# Patient Record
Sex: FEMALE | Race: WHITE | NOT HISPANIC OR LATINO
[De-identification: names, ages, dates, MRNs, and addresses within clinical notes are randomized per-mention and may not be internally consistent; named-entity substitution may affect disease eponyms.]

---

## 2024-03-11 ENCOUNTER — NON-APPOINTMENT (OUTPATIENT)
Age: 59
End: 2024-03-11

## 2024-03-13 ENCOUNTER — APPOINTMENT (OUTPATIENT)
Dept: GERIATRICS | Facility: CLINIC | Age: 59
End: 2024-03-13
Payer: COMMERCIAL

## 2024-03-13 VITALS
HEART RATE: 75 BPM | DIASTOLIC BLOOD PRESSURE: 90 MMHG | RESPIRATION RATE: 16 BRPM | OXYGEN SATURATION: 98 % | SYSTOLIC BLOOD PRESSURE: 150 MMHG | WEIGHT: 140 LBS | TEMPERATURE: 97.6 F

## 2024-03-13 PROBLEM — Z00.00 ENCOUNTER FOR PREVENTIVE HEALTH EXAMINATION: Status: ACTIVE | Noted: 2024-03-13

## 2024-03-13 PROCEDURE — 99205 OFFICE O/P NEW HI 60 MIN: CPT

## 2024-03-13 NOTE — ASSESSMENT
[FreeTextEntry1] : 59 y/o female with lung mass, widespread osseus metastasis, back pain anxiety, depression, anorexia, insomnia, diarrhea Bx pending- MRI spine pending no pathological fx no s/s of spinal compression  Back pain- ES tylenol. salon pas, oxycodone 5 mg po q6h prn- discussed side effects of drowsiness lethargy and not to drive or operate heavy machinery if feeling drowsy with meds  Anxiety/ depression/ anorexia-/ insomnia- mirtazapine 7.5 mg po qhs medical cannbis  certificate provided- PC3- [id number]  HCP discussed- form completed-  f/up in 2 weeks [Depression] : depression [Advanced Directives] : advanced directives [Pain Management] : pain management [Medication Management] : medication management [Completed Healthcare Proxy] : completed healthcare proxy [Full Code] : Code Status: Full Code

## 2024-03-13 NOTE — HISTORY OF PRESENT ILLNESS
[FreeTextEntry1] : 57 y/o F referred for palliative management by Dr Hitchcock. PMHx of depression/anxiety- not on any meds. Pt's dad passed away from Lung cancer in 2023- she was cleaning out his home and felt back pain beginning of February and then developed palpitations- went ot ED 24- diagnosed with new onset A Fib- started on metoprolol and eliquis CT chest done to r/o PE- no PE but showed 3 cm irregular mass in NICANOR, possible mass liver PET scan- + lung mass 3.3 cm, 7 mm RML nodule, multiple additional subcm nodules widespread osseus mets- C2, T6 pedicle, Left 8th rib, lytic lesion and endplate fx in L2 lesion L5, left iliac wing, left sacrum, rt iliac bone rt sup acetabulum, left ischial tuberosity  Brain MRI done yesterday reportedly positive for brain mets BX scheduled for tomorrow  Social Hx- smoker age [age] then quit, now smokes occasionally works as a  hx of recreational drug use in high school, occ ETOH  Family Hx of cancer- colon/ lung- dad sister- breast cancer  MRI imaging pending of the whole spine per Dr Hitchcock' s note Patient is very distraught  with this life changing diagnosis. She lives alone and is independent in ADL/ambulation. She has 2 daughters- 28 and 26 years old Her sister  of breast cancer 6-7 years ago She has a brother but unfortunately they have been estranged since her father's death last year. c/o back pain which sometimes rradiates down the buttocks She is taking ES tylenol/ salon pas  for the pain which helps but does not take the pain away rt sided shock like chest pain 10/10 increases with movement, no pain at rest decreased appetite difficulty sleeping very anxious and depressed

## 2024-03-13 NOTE — PHYSICAL EXAM
[General Appearance - Alert] : alert [General Appearance - In No Acute Distress] : in no acute distress [General Appearance - Well Nourished] : well nourished [General Appearance - Well Developed] : well developed [General Appearance - Well-Appearing] : healthy appearing [FreeTextEntry1] : anxious, tearful depressed [Sclera] : the sclera and conjunctiva were normal [PERRL With Normal Accommodation] : pupils were equal in size, round, and reactive to light [Extraocular Movements] : extraocular movements were intact [Normal Oral Mucosa] : normal oral mucosa [Neck Appearance] : the appearance of the neck was normal [] : no respiratory distress [Exaggerated Use Of Accessory Muscles For Inspiration] : no accessory muscle use [Apical Impulse] : the apical impulse was normal [Heart Rate And Rhythm] : heart rate was normal and rhythm regular [Heart Sounds] : normal S1 and S2 [Arterial Pulses Carotid] : carotid pulses were normal with no bruits [Bowel Sounds] : normal bowel sounds [Abdomen Soft] : soft [Abdomen Tenderness] : non-tender [Cranial Nerves] : cranial nerves 2-12 were intact [Oriented To Time, Place, And Person] : oriented to person, place, and time [Impaired Insight] : insight and judgment were intact

## 2024-03-21 ENCOUNTER — RESULT REVIEW (OUTPATIENT)
Age: 59
End: 2024-03-21

## 2024-03-21 ENCOUNTER — APPOINTMENT (OUTPATIENT)
Dept: HEMATOLOGY ONCOLOGY | Facility: CLINIC | Age: 59
End: 2024-03-21
Payer: COMMERCIAL

## 2024-03-21 VITALS
RESPIRATION RATE: 16 BRPM | WEIGHT: 147 LBS | TEMPERATURE: 97.4 F | HEIGHT: 65 IN | BODY MASS INDEX: 24.49 KG/M2 | DIASTOLIC BLOOD PRESSURE: 91 MMHG | SYSTOLIC BLOOD PRESSURE: 160 MMHG | OXYGEN SATURATION: 97 % | HEART RATE: 67 BPM

## 2024-03-21 DIAGNOSIS — Z87.891 PERSONAL HISTORY OF NICOTINE DEPENDENCE: ICD-10-CM

## 2024-03-21 DIAGNOSIS — Z80.0 FAMILY HISTORY OF MALIGNANT NEOPLASM OF DIGESTIVE ORGANS: ICD-10-CM

## 2024-03-21 DIAGNOSIS — Z80.1 FAMILY HISTORY OF MALIGNANT NEOPLASM OF TRACHEA, BRONCHUS AND LUNG: ICD-10-CM

## 2024-03-21 DIAGNOSIS — Z87.898 PERSONAL HISTORY OF OTHER SPECIFIED CONDITIONS: ICD-10-CM

## 2024-03-21 DIAGNOSIS — Z86.39 PERSONAL HISTORY OF OTHER ENDOCRINE, NUTRITIONAL AND METABOLIC DISEASE: ICD-10-CM

## 2024-03-21 DIAGNOSIS — Z80.3 FAMILY HISTORY OF MALIGNANT NEOPLASM OF BREAST: ICD-10-CM

## 2024-03-21 PROCEDURE — G2212 PROLONG OUTPT/OFFICE VIS: CPT

## 2024-03-21 PROCEDURE — 99205 OFFICE O/P NEW HI 60 MIN: CPT

## 2024-03-21 RX ORDER — APIXABAN 5 MG/1
5 TABLET, FILM COATED ORAL
Refills: 0 | Status: ACTIVE | COMMUNITY

## 2024-03-22 PROBLEM — Z80.0 FAMILY HISTORY OF ESOPHAGEAL CARCINOMA: Status: ACTIVE | Noted: 2024-03-21

## 2024-03-22 PROBLEM — Z86.39 HISTORY OF THYROID NODULE: Status: RESOLVED | Noted: 2024-03-21 | Resolved: 2024-03-22

## 2024-03-22 PROBLEM — Z87.891 FORMER SMOKER: Status: ACTIVE | Noted: 2024-03-21

## 2024-03-22 PROBLEM — Z87.898 FORMER CONSUMPTION OF ALCOHOL: Status: ACTIVE | Noted: 2024-03-21

## 2024-03-22 PROBLEM — Z80.1 FAMILY HISTORY OF LUNG CANCER: Status: ACTIVE | Noted: 2024-03-21

## 2024-03-22 PROBLEM — Z80.3 FAMILY HISTORY OF MALIGNANT NEOPLASM OF BREAST: Status: ACTIVE | Noted: 2024-03-21

## 2024-03-25 ENCOUNTER — APPOINTMENT (OUTPATIENT)
Dept: RADIATION ONCOLOGY | Facility: CLINIC | Age: 59
End: 2024-03-25
Payer: COMMERCIAL

## 2024-03-25 VITALS
BODY MASS INDEX: 24.49 KG/M2 | HEIGHT: 65 IN | HEART RATE: 77 BPM | RESPIRATION RATE: 18 BRPM | DIASTOLIC BLOOD PRESSURE: 86 MMHG | WEIGHT: 147 LBS | OXYGEN SATURATION: 97 % | SYSTOLIC BLOOD PRESSURE: 171 MMHG

## 2024-03-25 PROCEDURE — 99205 OFFICE O/P NEW HI 60 MIN: CPT | Mod: 25

## 2024-03-27 ENCOUNTER — APPOINTMENT (OUTPATIENT)
Dept: HEMATOLOGY ONCOLOGY | Facility: CLINIC | Age: 59
End: 2024-03-27
Payer: COMMERCIAL

## 2024-03-27 ENCOUNTER — NON-APPOINTMENT (OUTPATIENT)
Age: 59
End: 2024-03-27

## 2024-03-27 PROCEDURE — 99214 OFFICE O/P EST MOD 30 MIN: CPT

## 2024-03-27 RX ORDER — FOLIC ACID 1 MG/1
1 TABLET ORAL DAILY
Qty: 90 | Refills: 3 | Status: ACTIVE | COMMUNITY
Start: 2024-03-27 | End: 1900-01-01

## 2024-03-27 RX ORDER — OLANZAPINE 2.5 MG/1
2.5 TABLET, FILM COATED ORAL
Qty: 30 | Refills: 2 | Status: ACTIVE | COMMUNITY
Start: 2024-03-27 | End: 1900-01-01

## 2024-03-27 RX ORDER — LIDOCAINE AND PRILOCAINE 25; 25 MG/G; MG/G
2.5-2.5 CREAM TOPICAL
Qty: 1 | Refills: 4 | Status: ACTIVE | COMMUNITY
Start: 2024-03-27 | End: 1900-01-01

## 2024-03-27 NOTE — REASON FOR VISIT
[Home] : at home, [unfilled] , at the time of the visit. [Medical Office: ([facility])___] : at the medical office located in  [Patient] : the patient [Self] : self [Follow-Up Visit] : a follow-up [Friend] : friend [FreeTextEntry2] : Metastatic lung cancer

## 2024-03-27 NOTE — ASSESSMENT
[FreeTextEntry1] : 58-year-old female referred by Dr. Aryan Tinoco for evaluation of newly diagnosed lung cancer st 4 with bone and brain metastasis. Patient is a former smoker- smoked since age 14 x 15 years. PETCT - bone metastasis, 3.3 cm left apical nodule. MRI brain with > 20 small mets. Patient had some back pain intermittently. Reviewed options for systemic therapy including chemotherapy based on Alimta/ carboplatin + targeted therapy based on the results of pathology report and NGS. Order [facility]. Guardant 360 ordered. 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  Patient offered systemic therapy with Osimertinib / carbo/ Alimta ( chemo x 4 cycles) based on FLAURA2 trial.  Xgeva Given good penetrance of TKI into the brain tissue will defer WBRT and repeat brain MRI in 3 weeks  Treatment schema, side effects and monitoring parameters explained to patient.  Treatment at [place]  Alimta 500mg /m2 x 1.74 =870 mg x 4 q 21 d Carboplatin AUC 6 - 704 mg x 4 Q 21 d Neulasta on pro 6 mg Osimertinib 80 mg PO daily  # Rad onc and NS evaluation for tx  #Patient offered genetic testing.- sister with BCA -  age 50. We discussed: Plan for genetic panel.   Reviewed with patient impact of positive vs negative results and that test might not be informative or .  We discussed that blood related family members might be carrying the same genetic mutation. Test confidentiality.  Options or limitations of medical surveillance and strategies for prevention after genetic testing results. Importance of sharing genetic test results with at-risk relatives they might benefit from this information.  Plan for follow up after testing

## 2024-03-27 NOTE — ASSESSMENT
[FreeTextEntry1] : 58-year-old female referred by Dr. Aryan Tinoco for evaluation of newly diagnosed lung cancer st 4 with bone and brain metastasis. Patient is a former smoker- smoked since age 14 x 15 years. PETCT - bone metastasis, 3.3 cm left apical nodule. MRI brain with mets. Patient had some back pain intermittently. Reviewed options for systemic therapy including chemotherapy based on Alimta/ carboplatin + targeted therapy based on the results of pathology report and NGS. Order [facility]. Guardant 360 ordered. Palliative care   # Rad onc and NS evaluation for tx  #Patient offered genetic testing.- sister with BCA -  age 50. We discussed: Plan for genetic panel.   Reviewed with patient impact of positive vs negative results and that test might not be informative or .  We discussed that blood related family members might be carrying the same genetic mutation. Test confidentiality.  Options or limitations of medical surveillance and strategies for prevention after genetic testing results. Importance of sharing genetic test results with at-risk relatives they might benefit from this information.  Plan for follow up after testing

## 2024-03-27 NOTE — HISTORY OF PRESENT ILLNESS
[Disease: _____________________] : Disease: [unfilled] [T: ___] : T[unfilled] [M: ___] : M[unfilled] [de-identified] : 58-year-old female presents today for initial consultation of metastatic lung cancer, referred by Dr. Tinoco.  Patient presented to ED with complaints of lower back pain x 1 week and palpitations on 2024.  CT of the chest showed a 3cm irregular mass in the NICANOR, small infiltrates in the lungs, normal pleural and a possible mass in the medial aspect of the liver.  She was discharged home and underwent a PET CT.  Currently she is rating her lower back pain 6-7- improves with moving around, she admits to some weight loss since she found out of her diagnosis. She does have a dry cough, denies SOB.    PET CT 3/7/2024: Hypermetabolic left apical malignancy with widespread osseous mets Hypermetabolic left lower paratracheal node, likely metastatic Multiple bilateral pulmonary nodules measuring 7mm without hypermetabolic activity, likely below the resolution of PET, indeterminate for met disease Hypermetabolic focus between the liver and right adrenal gland favored to represent adrenal gland mets 2.9cm right thyroid nodule without hypermetabolic activity.  Rec thyroid US.    3/12/2024: MRI cervical spine: Findings suspicious for osseous mets within C2 posterior elements and T2 vertebral body.  Redemonstrated multiple small mets in the posterior fossa  3/12/2024: MRI Brain: New diffuse bilateral supratentorial and infratentorial small enhancing nodular lesions, compatible with mets.    3/14/2024: Biopsy  L5 vertebral bone lesion: Metastatic adenocarcinoma, consistent with lung primary, involving bone.  NGS PENDING   3/18/2024: MRI Lumbar Spine: Osseous mets are noted.  Abscesses involving the L2 and L4 vertebral bodies protrude posteriorly.    Social Hx- Former smoker, quit 29 yrs ago, smoked 1 PPD x 17 yrs Former ETOH use Denies recreational drug use , with 2 children Self employed   Family Hx- Sister  from met breast cancer 54 Father  met lung cancer in his 80s (smoker) Maternal auth with possible lung cancer  Maternal cousin  from esophageal cancer in his 50s Paternal uncle  with possible lung cancer   She never had genetic testing  Last Mammogram- 2024 Colonoscopy- 7 yrs ago Pap/pelvic- unsure     [de-identified] : Adenocarcinoma

## 2024-03-27 NOTE — HISTORY OF PRESENT ILLNESS
[Disease: _____________________] : Disease: [unfilled] [M: ___] : M[unfilled] [T: ___] : T[unfilled] [de-identified] : 58-year-old female presents today for initial consultation of metastatic lung cancer, referred by Dr. Tinoco.  Patient presented to ED with complaints of lower back pain x 1 week and palpitations on 2024.  CT of the chest showed a 3cm irregular mass in the NICANOR, small infiltrates in the lungs, normal pleural and a possible mass in the medial aspect of the liver.  She was discharged home and underwent a PET CT.  Currently she is rating her lower back pain 6-7- improves with moving around, she admits to some weight loss since she found out of her diagnosis. She does have a dry cough, denies SOB.    PET CT 3/7/2024: Hypermetabolic left apical malignancy with widespread osseous mets Hypermetabolic left lower paratracheal node, likely metastatic Multiple bilateral pulmonary nodules measuring 7mm without hypermetabolic activity, likely below the resolution of PET, indeterminate for met disease Hypermetabolic focus between the liver and right adrenal gland favored to represent adrenal gland mets 2.9cm right thyroid nodule without hypermetabolic activity.  Rec thyroid US.    3/12/2024: MRI cervical spine: Findings suspicious for osseous mets within C2 posterior elements and T2 vertebral body.  Redemonstrated multiple small mets in the posterior fossa  3/12/2024: MRI Brain: New diffuse bilateral supratentorial and infratentorial small enhancing nodular lesions, compatible with mets.    3/14/2024: Biopsy  L5 vertebral bone lesion: Metastatic adenocarcinoma, consistent with lung primary, involving bone.  NGS PENDING   3/18/2024: MRI Lumbar Spine: Osseous mets are noted.  Abscesses involving the L2 and L4 vertebral bodies protrude posteriorly.    Social Hx- Former smoker, quit 29 yrs ago, smoked 1 PPD x 17 yrs Former ETOH use Denies recreational drug use , with 2 children Self employed   Family Hx- Sister  from met breast cancer 54 Father  met lung cancer in his 80s (smoker) Maternal auth with possible lung cancer  Maternal cousin  from esophageal cancer in his 50s Paternal uncle  with possible lung cancer   She never had genetic testing  Last Mammogram- 2024 Colonoscopy- 7 yrs ago Pap/pelvic- unsure     [de-identified] : Adenocarcinoma

## 2024-03-28 ENCOUNTER — NON-APPOINTMENT (OUTPATIENT)
Age: 59
End: 2024-03-28

## 2024-03-29 ENCOUNTER — RESULT REVIEW (OUTPATIENT)
Age: 59
End: 2024-03-29

## 2024-03-31 NOTE — LETTER CLOSING
[Consult Closing:] : Thank you for allowing me to participate in the care of this patient.  If you have any questions, please do not hesitate to contact me. [Sincerely yours,] : Sincerely yours, [FreeTextEntry3] : Alicia Alvarez MD

## 2024-03-31 NOTE — HISTORY OF PRESENT ILLNESS
[FreeTextEntry1] : Ms. Mckoy is a 58 year old female, diagnosed with metastatic lung cancer with hepatic, adrenal,  osseous and CNS metastasis, referred to our office for a consultation to discuss palliative radiation therapy.   Ms. Mckoy present to the ER with complaints of lower back pain and palpitations on 24. CT Chest (24) demonstrated dense left apical mass measuring 3 cm in diameter. Multiple patchy nodular airspace opacities. Hyper-enhancing mass along the inferior aspect of hepatic segment IVb measuring 1.9 cm.   PET/CT (3/7/24) revealed hypermetabolic left apical malignancy with widespread osseous metastases. Hypermetabolic left lower paratracheal node, likely metastatic. Multiple bilateral pulmonary nodules measuring 7 mm without hypermetabolic activity - indeterminate for metastatic disease. Hypermetabolic focus between the liver and right adrenal gland favored to represent adrenal gland metastatic disease. 2.9 cm right thyroid nodule without hypermetabolic activity. Thyroid US recommended.   MRI Cervical Spine (3/12/24) finding suspicious for osseous metastases within the C2 posterior elements and T2 vertebral body. No epidural extension of disease. Multilevel cervical spine degenerative disease. Multiple small mestastases in the posterior fossa.  MRI brain (3/12/24) demonstrated diffuse bilateral supra and infratentorial small enhnancing nodular lesions compatible with metastasis.There was no associated mass effect or hemorrhage.  On 3/15/24, she underwent  a biopsy  of L5 and pathology revealed metastatic adenocarcinoma, consistent with lung primary, involving bone.   On 3/18/24, MRI Lumbar Spine revealed: osseous metastatic disease was noted. Abscesses involving the L2 and L4 vertebral bodies protrude posteriorly.  She was started on oxycodone 5 MG by Dr. Nayak, which she reports is effective for approximately 1 hour. She was also given mirtazapine for sleep. She reports her pain in the right chest to be a 10 / 10 on the pain scale, which is aggravated with the lifting of her arms. Her lower back pain with exertion is reported to be a 10 / 10 with sitting to standing motions or prolonged sitting. She denies any cough, shortness of breath, hemoptysis, phlegm, weight loss and appetite.   Ms. Mckoy is a former smoker, who quit 29 years ago, smoked 1 PPD x 17 years ago. No alcohol use. She has family history of a sister  from met breast cancer 54, father  met lung cancer in his 80s (smoker), Maternal aunt with possible lung cancer, maternal cousin  from esophageal cancer in his 50s, and paternal uncle  with possible lung cancer.   Ms. Mckoy presents today to discuss palliative radiation therapy to the brain and the cervical/lumbar spine.

## 2024-03-31 NOTE — REASON FOR VISIT
[Lung Cancer] : lung cancer [Consideration for Non-Curative Therapy] : consideration for non-curative therapy for [Brain Metastasis] : brain metastasis [Bone Metastasis] : bone metastasis

## 2024-03-31 NOTE — VITALS
[Least Pain Intensity: 0/10] : 0/10 [Maximal Pain Intensity: 0/10] : 0/10 [100: Normal, no complaints, no evidence of disease.] : 100: Normal, no complaints, no evidence of disease. [ECOG Performance Status: 0 - Fully active, able to carry on all pre-disease performance without restriction] : Performance Status: 0 - Fully active, able to carry on all pre-disease performance without restriction [Date: ____________] : Patient's last distress assessment performed on [unfilled]. [6 - Distress Level] : Distress Level: 6

## 2024-03-31 NOTE — DISEASE MANAGEMENT
[Clinical] : TNM Stage: c [IV] : IV [FreeTextEntry4] : Metastatic nonsmall cell lung cancer with hepatic, adrenal,  osseous and CNS metastasis [TTNM] : x [NTNM] : x [MTNM] : 1

## 2024-04-02 ENCOUNTER — NON-APPOINTMENT (OUTPATIENT)
Age: 59
End: 2024-04-02

## 2024-04-03 ENCOUNTER — APPOINTMENT (OUTPATIENT)
Dept: GERIATRICS | Facility: CLINIC | Age: 59
End: 2024-04-03
Payer: COMMERCIAL

## 2024-04-03 ENCOUNTER — APPOINTMENT (OUTPATIENT)
Dept: HEMATOLOGY ONCOLOGY | Facility: CLINIC | Age: 59
End: 2024-04-03

## 2024-04-03 ENCOUNTER — RESULT REVIEW (OUTPATIENT)
Age: 59
End: 2024-04-03

## 2024-04-03 ENCOUNTER — NON-APPOINTMENT (OUTPATIENT)
Age: 59
End: 2024-04-03

## 2024-04-03 VITALS
SYSTOLIC BLOOD PRESSURE: 137 MMHG | DIASTOLIC BLOOD PRESSURE: 81 MMHG | OXYGEN SATURATION: 99 % | RESPIRATION RATE: 16 BRPM | WEIGHT: 147.06 LBS | BODY MASS INDEX: 24.5 KG/M2 | HEIGHT: 65 IN | TEMPERATURE: 97.6 F | HEART RATE: 64 BPM

## 2024-04-03 VITALS
DIASTOLIC BLOOD PRESSURE: 81 MMHG | HEIGHT: 65 IN | TEMPERATURE: 97.6 F | RESPIRATION RATE: 16 BRPM | WEIGHT: 147 LBS | BODY MASS INDEX: 24.49 KG/M2 | HEART RATE: 64 BPM | OXYGEN SATURATION: 99 % | SYSTOLIC BLOOD PRESSURE: 137 MMHG

## 2024-04-03 DIAGNOSIS — G89.3 NEOPLASM RELATED PAIN (ACUTE) (CHRONIC): ICD-10-CM

## 2024-04-03 DIAGNOSIS — M54.50 LOW BACK PAIN, UNSPECIFIED: ICD-10-CM

## 2024-04-03 PROCEDURE — 99214 OFFICE O/P EST MOD 30 MIN: CPT

## 2024-04-03 NOTE — PHYSICAL EXAM
[General Appearance - Alert] : alert [General Appearance - In No Acute Distress] : in no acute distress [General Appearance - Well Nourished] : well nourished [General Appearance - Well Developed] : well developed [General Appearance - Well-Appearing] : healthy appearing [Sclera] : the sclera and conjunctiva were normal [PERRL With Normal Accommodation] : pupils were equal in size, round, and reactive to light [Extraocular Movements] : extraocular movements were intact [Outer Ear] : the ears and nose were normal in appearance [Normal Oral Mucosa] : normal oral mucosa [Neck Appearance] : the appearance of the neck was normal [] : no respiratory distress [Respiration, Rhythm And Depth] : normal respiratory rhythm and effort [Exaggerated Use Of Accessory Muscles For Inspiration] : no accessory muscle use [Auscultation Breath Sounds / Voice Sounds] : lungs were clear to auscultation bilaterally [Apical Impulse] : the apical impulse was normal [Heart Rate And Rhythm] : heart rate was normal and rhythm regular [Abdomen Soft] : soft [Bowel Sounds] : normal bowel sounds [Abdomen Tenderness] : non-tender [Abnormal Walk] : normal gait [Skin Turgor] : normal skin turgor [Skin Color & Pigmentation] : normal skin color and pigmentation [Oriented To Time, Place, And Person] : oriented to person, place, and time [Cranial Nerves] : cranial nerves 2-12 were intact [Affect] : the affect was normal [Impaired Insight] : insight and judgment were intact [Memory Recent] : recent memory was not impaired [Mood] : the mood was normal [Over the Past 2 Weeks, Have You Felt Down, Depressed, or Hopeless?] : 1.) Over the past 2 weeks, have you felt down, depressed, or hopeless? No [Memory Remote] : remote memory was not impaired [Over the Past 2 Weeks, Have You Felt Little Interest or Pleasure Doing Things?] : 2.) Over the past 2 weeks, have you felt little interest or pleasure doing things? No

## 2024-04-03 NOTE — ASSESSMENT
[FreeTextEntry1] : 57 y/o female with lung mass, widespread osseus metastasis, back pain anxiety, depression, anorexia, insomnia, diarrhea no s/s of spinal compression  Back pain/ rt sided chest pain- oxycodone not effective- effect lastz only for 1 hour dc oxycodone start tizanidine 2 mg tid buprenorphine patch 5 mcg q week  Lidocaine patch topically to lower back- 12 h on, 12 h off - discussed side effects of drowsiness lethargy and not to drive or operate heavy machinery if feeling drowsy with meds  Anxiety/ depression/ anorexia-/ insomnia- cont mirtazapine 7.5 mg po qhs medical cannbis certificate provided- PC3- [id number]  f/up in 3 weeks.   [Driving] : driving [Patient/Caregiver is not ready to engage in discussion] : Patient/caregiver is not ready to engage in discussion [Pain Management] : pain management [Full Code] : Code Status: Full Code

## 2024-04-03 NOTE — HISTORY OF PRESENT ILLNESS
[FreeTextEntry1] : 59 y/o F referred for palliative management by Dr Hitchcock. PMHx of depression/anxiety- not on any meds. Pt's dad passed away from Lung cancer in 2023- she was cleaning out his home and felt back pain beginning of February and then developed palpitations- went ot ED 24- diagnosed with new onset A Fib- started on metoprolol and eliquis CT chest done to r/o PE- no PE but showed 3 cm irregular mass in NICANOR, possible mass liver PET scan- + lung mass 3.3 cm, 7 mm RML nodule, multiple additional subcm nodules widespread osseus mets- C2, T6 pedicle, Left 8th rib, lytic lesion and endplate fx in L2 lesion L5, left iliac wing, left sacrum, rt iliac bone rt sup acetabulum, left ischial tuberosity  Brain MRI done yesterday reportedly positive for brain mets BX scheduled for tomorrow  Social Hx- smoker age [age] then quit, now smokes occasionally works as a  hx of recreational drug use in high school, occ ETOH  Family Hx of cancer- colon/ lung- dad sister- breast cancer  MRI imaging pending of the whole spine per Dr Hitchcock' s note Patient is very distraught with this life changing diagnosis. She lives alone and is independent in ADL/ambulation. She has 2 daughters- 28 and 26 years old Her sister  of breast cancer 6-7 years ago She has a brother but unfortunately they have been estranged since her father's death last year. c/o back pain which sometimes rradiates down the buttocks She is taking ES tylenol/ salon pas for the pain which helps but does not take the pain away rt sided shock like chest pain 10/10 increases with movement, no pain at rest decreased appetite difficulty sleeping very anxious and depressed  3/12/2024: MRI Brain: New diffuse bilateral supratentorial and infratentorial small enhancing nodular lesions, compatible with mets.  3/14/2024: Biopsy L5 vertebral bone lesion: Metastatic adenocarcinoma, consistent with lung primary, involving bone. NGS PENDING  3/18/2024: MRI Lumbar Spine: Osseous mets are noted. Abscesses involving the L2 and L4 vertebral bodies protrude posteriorly. started Alimta 500mg /m2 x 1.74 =870 mg x 4 q 21 d Carboplatin AUC 6 - 704 mg x 4 Q 21 d Neulasta on pro 6 mg Osimertinib 80 mg PO daily.  Seen by Rad Onc Dr Alvarez- Next generation sequencing is pending. Depending on the results whole brain RT with hippocampal sparing may be able to be omitted or delayed. Ms. Mckoy may benefit from palliative RT to her lumbar spine.  Stereotactic radiation therapy was reviewed as part of the process for treatment. Specifically, SRS/SBRT is appropriate in order to provide an appropriately high radiation dose per fraction to the tumor.  Tobias placed 3/27 Planned MRI Brain in 3 weeks to assess response  4/3/24 Seen for f/up in the infusion center started chemorx today still withj intermittent pain to back/ rt side shoulder affected by neck and back movement Pain appears to be spasmodic related to movement but very severe 10/10 no constipation; moo d better, sleeping better

## 2024-04-07 ENCOUNTER — RESULT REVIEW (OUTPATIENT)
Age: 59
End: 2024-04-07

## 2024-04-11 ENCOUNTER — APPOINTMENT (OUTPATIENT)
Dept: HEMATOLOGY ONCOLOGY | Facility: CLINIC | Age: 59
End: 2024-04-11
Payer: COMMERCIAL

## 2024-04-11 ENCOUNTER — RESULT REVIEW (OUTPATIENT)
Age: 59
End: 2024-04-11

## 2024-04-11 ENCOUNTER — TRANSCRIPTION ENCOUNTER (OUTPATIENT)
Age: 59
End: 2024-04-11

## 2024-04-11 VITALS
DIASTOLIC BLOOD PRESSURE: 67 MMHG | WEIGHT: 144.6 LBS | BODY MASS INDEX: 24.09 KG/M2 | HEART RATE: 63 BPM | OXYGEN SATURATION: 98 % | SYSTOLIC BLOOD PRESSURE: 115 MMHG | TEMPERATURE: 97.2 F | RESPIRATION RATE: 16 BRPM | HEIGHT: 65 IN

## 2024-04-11 PROCEDURE — 99214 OFFICE O/P EST MOD 30 MIN: CPT

## 2024-04-16 ENCOUNTER — NON-APPOINTMENT (OUTPATIENT)
Age: 59
End: 2024-04-16

## 2024-04-16 NOTE — HISTORY OF PRESENT ILLNESS
[Disease: _____________________] : Disease: [unfilled] [T: ___] : T[unfilled] [M: ___] : M[unfilled] [de-identified] : 58-year-old female presents today for initial consultation of metastatic lung cancer, referred by Dr. Tinoco.  Patient presented to ED with complaints of lower back pain x 1 week and palpitations on 2024.  CT of the chest showed a 3cm irregular mass in the NICANOR, small infiltrates in the lungs, normal pleural and a possible mass in the medial aspect of the liver.  She was discharged home and underwent a PET CT.  Currently she is rating her lower back pain 6-7- improves with moving around, she admits to some weight loss since she found out of her diagnosis. She does have a dry cough, denies SOB.    PET CT 3/7/2024: Hypermetabolic left apical malignancy with widespread osseous mets Hypermetabolic left lower paratracheal node, likely metastatic Multiple bilateral pulmonary nodules measuring 7mm without hypermetabolic activity, likely below the resolution of PET, indeterminate for met disease Hypermetabolic focus between the liver and right adrenal gland favored to represent adrenal gland mets 2.9cm right thyroid nodule without hypermetabolic activity.  Rec thyroid US.    3/12/2024: MRI cervical spine: Findings suspicious for osseous mets within C2 posterior elements and T2 vertebral body.  Redemonstrated multiple small mets in the posterior fossa  3/12/2024: MRI Brain: New diffuse bilateral supratentorial and infratentorial small enhancing nodular lesions, compatible with mets.    3/14/2024: Biopsy  L5 vertebral bone lesion: Metastatic adenocarcinoma, consistent with lung primary, involving bone.  NGS PENDING   3/18/2024: MRI Lumbar Spine: Osseous mets are noted.  Abscesses involving the L2 and L4 vertebral bodies protrude posteriorly.    Social Hx- Former smoker, quit 29 yrs ago, smoked 1 PPD x 17 yrs Former ETOH use Denies recreational drug use , with 2 children Self employed   Family Hx- Sister  from met breast cancer 54 Father  met lung cancer in his 80s (smoker) Maternal auth with possible lung cancer  Maternal cousin  from esophageal cancer in his 50s Paternal uncle  with possible lung cancer   She never had genetic testing  Last Mammogram- 2024 Colonoscopy- 7 yrs ago Pap/pelvic- unsure     [de-identified] : Adenocarcinoma [de-identified] : Patient is here is for follow up for adenocarcinoma. Patient states that she is feeling well except she had one day she was having severe acid reflux.

## 2024-04-16 NOTE — ASSESSMENT
[FreeTextEntry1] : 58-year-old female referred by Dr. Aryan Tinoco for evaluation of newly diagnosed lung cancer st 4 with bone and brain metastasis. Patient is a former smoker- smoked since age 14 x 15 years. PETCT - bone metastasis, 3.3 cm left apical nodule. MRI brain with > 20 small mets. Patient had some back pain intermittently. Reviewed options for systemic therapy including chemotherapy based on Alimta/ carboplatin + targeted therapy based on the results of pathology report and NGS. Order [facility]. [facility] ordered. Palliative care   Results of [facility] revealed EGFR mutation L858R in 18%.  Patient offered systemic therapy with Osimertinib / carbo/ Alimta ( chemo x 4 cycles) based on FLAURA2 trial.  Xgeva Given good penetrance of TKI into the brain tissue will defer WBRT and repeat brain MRI in 3 weeks  Treatment schema, side effects and monitoring parameters explained to patient.  Treatment at [facility]  Alimta 500mg /m2 x 1.74 =870 mg x 4 q 21 d Carboplatin AUC 6 - 704 mg x 4 Q 21 d Neulasta on pro 6 mg Osimertinib 80 mg PO daily  Cycle 1 4/3/24  # CNS mets Repeat MRI brain in 3--4 weeks MRI 24- reviewed, stable No neuro symptoms  # Rad onc and NS evaluation for tx - scheduled for RT back  #Patient offered genetic testing- sister with BCA -  age 50. Negative genetic testing [Palliative] : Goals of care discussed with patient: Palliative [Palliative Care Plan] : not applicable at this time

## 2024-04-16 NOTE — REASON FOR VISIT
[Follow-Up Visit] : a follow-up [Friend] : friend [Home] : at home, [unfilled] , at the time of the visit. [Medical Office: ([facility])___] : at the medical office located in  [Patient] : the patient [Self] : self [FreeTextEntry2] : Metastatic lung cancer

## 2024-04-22 ENCOUNTER — NON-APPOINTMENT (OUTPATIENT)
Age: 59
End: 2024-04-22

## 2024-04-22 NOTE — REASON FOR VISIT
[Routine On-Treatment] : a routine on-treatment visit for [Brain Metastasis] : brain metastasis [Bone Metastasis] : bone metastasis [Lung Cancer] : lung cancer

## 2024-04-23 ENCOUNTER — NON-APPOINTMENT (OUTPATIENT)
Age: 59
End: 2024-04-23

## 2024-04-25 ENCOUNTER — NON-APPOINTMENT (OUTPATIENT)
Age: 59
End: 2024-04-25

## 2024-04-25 ENCOUNTER — RESULT REVIEW (OUTPATIENT)
Age: 59
End: 2024-04-25

## 2024-04-25 ENCOUNTER — APPOINTMENT (OUTPATIENT)
Dept: HEMATOLOGY ONCOLOGY | Facility: CLINIC | Age: 59
End: 2024-04-25
Payer: COMMERCIAL

## 2024-04-25 VITALS
WEIGHT: 146.06 LBS | OXYGEN SATURATION: 99 % | DIASTOLIC BLOOD PRESSURE: 75 MMHG | HEART RATE: 69 BPM | SYSTOLIC BLOOD PRESSURE: 124 MMHG | BODY MASS INDEX: 24.33 KG/M2 | TEMPERATURE: 97.3 F | RESPIRATION RATE: 16 BRPM | HEIGHT: 65 IN

## 2024-04-25 PROCEDURE — 99214 OFFICE O/P EST MOD 30 MIN: CPT

## 2024-04-25 PROCEDURE — G2211 COMPLEX E/M VISIT ADD ON: CPT

## 2024-04-25 NOTE — HISTORY OF PRESENT ILLNESS
[de-identified] : 58-year-old female presents today for initial consultation of metastatic lung cancer, referred by Dr. Tinoco.  Patient presented to ED with complaints of lower back pain x 1 week and palpitations on 2024.  CT of the chest showed a 3cm irregular mass in the NICANOR, small infiltrates in the lungs, normal pleural and a possible mass in the medial aspect of the liver.  She was discharged home and underwent a PET CT.  Currently she is rating her lower back pain 6-7- improves with moving around, she admits to some weight loss since she found out of her diagnosis. She does have a dry cough, denies SOB.    PET CT 3/7/2024: Hypermetabolic left apical malignancy with widespread osseous mets Hypermetabolic left lower paratracheal node, likely metastatic Multiple bilateral pulmonary nodules measuring 7mm without hypermetabolic activity, likely below the resolution of PET, indeterminate for met disease Hypermetabolic focus between the liver and right adrenal gland favored to represent adrenal gland mets 2.9cm right thyroid nodule without hypermetabolic activity.  Rec thyroid US.    3/12/2024: MRI cervical spine: Findings suspicious for osseous mets within C2 posterior elements and T2 vertebral body.  Redemonstrated multiple small mets in the posterior fossa  3/12/2024: MRI Brain: New diffuse bilateral supratentorial and infratentorial small enhancing nodular lesions, compatible with mets.    3/14/2024: Biopsy  L5 vertebral bone lesion: Metastatic adenocarcinoma, consistent with lung primary, involving bone.  NGS PENDING   3/18/2024: MRI Lumbar Spine: Osseous mets are noted.  Abscesses involving the L2 and L4 vertebral bodies protrude posteriorly.    Social Hx- Former smoker, quit 29 yrs ago, smoked 1 PPD x 17 yrs Former ETOH use Denies recreational drug use , with 2 children Self employed   Family Hx- Sister  from met breast cancer 54 Father  met lung cancer in his 80s (smoker) Maternal auth with possible lung cancer  Maternal cousin  from esophageal cancer in his 50s Paternal uncle  with possible lung cancer   She never had genetic testing  Last Mammogram- 2024 Colonoscopy- 7 yrs ago Pap/pelvic- unsure     [de-identified] : Adenocarcinoma [de-identified] : Patient is here is for follow up for adenocarcinoma. Patient overall feels well with no new issues; she states that she has been having a lot of post nasal drip. She states that it started developed post radiation.  Patient had a brain MRI which revealed similar diffuse innumerable multifocal intracranial metastases in comparison to 3/12/2024 when allowing for differences in technique..  Patient is schedule for another brain MRI on May 1st, 2024.

## 2024-04-25 NOTE — ASSESSMENT
[FreeTextEntry1] : 58-year-old female referred by Dr. Aryan Tinoco for evaluation of newly diagnosed lung cancer st 4 with bone and brain metastasis. Patient is a former smoker- smoked since age 14 x 15 years. PETCT - bone metastasis, 3.3 cm left apical nodule. MRI brain with > 20 small mets. Patient had some back pain intermittently. Reviewed options for systemic therapy including chemotherapy based on Alimta/ carboplatin + targeted therapy based on the results of pathology report and NGS. Order [facility].  Palliative care referral  Results of [facility] revealed EGFR mutation L858R in 18%.  Patient offered systemic therapy with Osimertinib / carbo/ Alimta ( chemo x 4 cycles) based on FLAURA2 trial.  Xgeva Given good penetrance of TKI into the brain tissue will defer WBRT and repeat brain MRI in 3 weeks  Treatment schema, side effects and monitoring parameters explained to patient.  Treatment at [facility]  Alimta 500mg /m2 x 1.74 =870 mg x 4 q 21 d Carboplatin AUC 6 - 704 mg x 4 Q 21 d Neulasta on pro 6 mg Osimertinib 80 mg PO daily  Cycle 1 4/3/24 Cycle 2 24  # Nausea- reviewed antiemetics, olanzapine and zifran  # CNS mets Repeat MRI brain in 3--4 weeks MRI 24- reviewed, stable No neuro symptoms - repeat MRI 24  # Rad onc and NS evaluation for tx - scheduled for RT back  #Patient offered genetic testing- sister with BCA -  age 50. Negative genetic testing

## 2024-04-26 NOTE — HISTORY OF PRESENT ILLNESS
[FreeTextEntry1] : Ms. Mckoy  is present for OTV today. She is status post completion of radiation to the lumbar spine (2700 cGy in 3 fractions). She reports since starting radiation therapy she has notice an improvement in her back pain. She is now able to ge tin and out of the car easier or up from the couch without severe pain/discomfort. However, she is noticing a new "sciatic-like" pain on her left side. She is very emotional during today's visit and speaks about being overwhelmed with her diagnosis. 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 Overall, she tolerated treatment well and will follow up in 1 month for her post treatment evaluation.

## 2024-04-30 ENCOUNTER — APPOINTMENT (OUTPATIENT)
Dept: HEMATOLOGY ONCOLOGY | Facility: CLINIC | Age: 59
End: 2024-04-30

## 2024-04-30 ENCOUNTER — RESULT REVIEW (OUTPATIENT)
Age: 59
End: 2024-04-30

## 2024-04-30 VITALS
OXYGEN SATURATION: 98 % | BODY MASS INDEX: 23.74 KG/M2 | TEMPERATURE: 98.2 F | HEART RATE: 77 BPM | RESPIRATION RATE: 16 BRPM | HEIGHT: 65 IN | SYSTOLIC BLOOD PRESSURE: 95 MMHG | DIASTOLIC BLOOD PRESSURE: 87 MMHG | WEIGHT: 142.5 LBS

## 2024-05-01 ENCOUNTER — RESULT REVIEW (OUTPATIENT)
Age: 59
End: 2024-05-01

## 2024-05-06 ENCOUNTER — NON-APPOINTMENT (OUTPATIENT)
Age: 59
End: 2024-05-06

## 2024-05-07 ENCOUNTER — RESULT REVIEW (OUTPATIENT)
Age: 59
End: 2024-05-07

## 2024-05-07 ENCOUNTER — APPOINTMENT (OUTPATIENT)
Dept: HEMATOLOGY ONCOLOGY | Facility: CLINIC | Age: 59
End: 2024-05-07

## 2024-05-07 VITALS
RESPIRATION RATE: 16 BRPM | SYSTOLIC BLOOD PRESSURE: 110 MMHG | TEMPERATURE: 97.8 F | BODY MASS INDEX: 23.66 KG/M2 | WEIGHT: 142 LBS | HEART RATE: 87 BPM | DIASTOLIC BLOOD PRESSURE: 65 MMHG | HEIGHT: 65 IN | OXYGEN SATURATION: 100 %

## 2024-05-08 NOTE — PHYSICAL EXAM
[General Appearance - Alert] : alert [General Appearance - In No Acute Distress] : in no acute distress [Cranial Nerves Optic (II)] : visual acuity intact bilaterally,  pupils equal round and reactive to light [Cranial Nerves Oculomotor (III)] : extraocular motion intact [Cranial Nerves Trigeminal (V)] : facial sensation intact symmetrically [Cranial Nerves Facial (VII)] : face symmetrical [Cranial Nerves Vestibulocochlear (VIII)] : hearing was intact bilaterally [Cranial Nerves Glossopharyngeal (IX)] : tongue and palate midline [Cranial Nerves Accessory (XI - Cranial And Spinal)] : head turning and shoulder shrug symmetric [Cranial Nerves Hypoglossal (XII)] : there was no tongue deviation with protrusion [Motor Strength] : muscle strength was normal in all four extremities [Sensation Tactile Decrease] : light touch was intact [Abnormal Walk] : normal gait

## 2024-05-10 ENCOUNTER — NON-APPOINTMENT (OUTPATIENT)
Age: 59
End: 2024-05-10

## 2024-05-10 ENCOUNTER — APPOINTMENT (OUTPATIENT)
Dept: HEMATOLOGY ONCOLOGY | Facility: CLINIC | Age: 59
End: 2024-05-10

## 2024-05-10 ENCOUNTER — RESULT REVIEW (OUTPATIENT)
Age: 59
End: 2024-05-10

## 2024-05-10 ENCOUNTER — APPOINTMENT (OUTPATIENT)
Dept: HEART AND VASCULAR | Facility: CLINIC | Age: 59
End: 2024-05-10
Payer: COMMERCIAL

## 2024-05-10 VITALS
RESPIRATION RATE: 17 BRPM | DIASTOLIC BLOOD PRESSURE: 76 MMHG | OXYGEN SATURATION: 100 % | BODY MASS INDEX: 24.17 KG/M2 | WEIGHT: 145.06 LBS | HEIGHT: 65 IN | TEMPERATURE: 97.1 F | SYSTOLIC BLOOD PRESSURE: 139 MMHG | HEART RATE: 80 BPM

## 2024-05-10 VITALS
BODY MASS INDEX: 23.67 KG/M2 | TEMPERATURE: 97.6 F | HEART RATE: 74 BPM | WEIGHT: 142.06 LBS | SYSTOLIC BLOOD PRESSURE: 124 MMHG | HEIGHT: 65 IN | DIASTOLIC BLOOD PRESSURE: 72 MMHG | OXYGEN SATURATION: 97 % | RESPIRATION RATE: 18 BRPM

## 2024-05-10 DIAGNOSIS — R06.09 OTHER FORMS OF DYSPNEA: ICD-10-CM

## 2024-05-10 PROCEDURE — 93000 ELECTROCARDIOGRAM COMPLETE: CPT | Mod: 59

## 2024-05-10 PROCEDURE — 93242 EXT ECG>48HR<7D RECORDING: CPT

## 2024-05-10 PROCEDURE — 99204 OFFICE O/P NEW MOD 45 MIN: CPT | Mod: 25

## 2024-05-12 ENCOUNTER — NON-APPOINTMENT (OUTPATIENT)
Age: 59
End: 2024-05-12

## 2024-05-13 ENCOUNTER — APPOINTMENT (OUTPATIENT)
Dept: NEUROSURGERY | Facility: CLINIC | Age: 59
End: 2024-05-13
Payer: COMMERCIAL

## 2024-05-13 VITALS
OXYGEN SATURATION: 100 % | HEIGHT: 65 IN | HEART RATE: 93 BPM | WEIGHT: 145 LBS | DIASTOLIC BLOOD PRESSURE: 79 MMHG | SYSTOLIC BLOOD PRESSURE: 126 MMHG | BODY MASS INDEX: 24.16 KG/M2

## 2024-05-13 PROCEDURE — 99205 OFFICE O/P NEW HI 60 MIN: CPT

## 2024-05-13 NOTE — HISTORY OF PRESENT ILLNESS
[de-identified] : SURENDRA SILVA is a 59 year female with a PMH of anxiety/depression, afib on eliquis, anemia, former smoker quit 29 years ago, EGFR related non small cell lung cancer, with spine and brain metastases who presents to the office today at the request of her Oncologist Dr. Aragon for neurosurgical consultation due to gamma knife discussion for treatment of her brain metastases.    She initially presented in February 2024 with low back pain and palpitations for 1 week.  CT chest showed 3cm irregular mass in the left upper lobe with possible liver mass.  PET/CT on 3/7/24 demonstrated hypermetabolic, left apical malignancy with widespread osseous metastases, hypermetabolic left lower paratracheal node, likely metastatic and hypermetabolic focus between the liver and right adrenal gland favored to represent adrenal gland metastases. MRI cervical spine and lumbar spine were performed and demonstrated findings suspicious for osseous mets. She underwent biopsy of L5 vertebral body on 3/14/24: Metastatic adenocarcinoma, consistent with lung primary MRI brain 3/12/24 demonstrated numerous small mets in supratentorial and infratentorial compartments.  She was started on chemotherapy and underwent RT to lumbar spine (2700 cGy in 3 fractions).  She has recently undergone repeat MRI brain on 5/1/24 which demonstrates improvement or stability of current lesions.   She is here for gamma knife radiosurgery discussion for adjuvant treatment of her brain metastases.  The patient denies headaches, visual change, numbness, weakness of extremities, speech disturbance, dizziness, neck pain, vertigo. Reports that she recently had to stop tagrisso due to elevated liver function test. Meds: eliquis, folic acid, buprenorphine patch, olanzapine, mirtazapine, tagrisso, tizanidine, metoprolol Allergies: NKDA Soc Hx: ex smoker, former EtOH, has 2 children,  works as

## 2024-05-13 NOTE — END OF VISIT
[FreeTextEntry3] : I have seen the patient and reviewed the case together with PA and I agree with the final recommendations and plan of care.  Kiel Canales MD Neurosurgery  [Time Spent: ___ minutes] : I have spent [unfilled] minutes of time on the encounter.

## 2024-05-13 NOTE — DATA REVIEWED
[de-identified] : Exam: MRI BRAIN WAW IC Order#: [id number]    PROCEDURE: MRI brain with and without intravenous contrast, 5/1/2024  TECHNIQUE: 1.5 Pricila magnet. Multiplanar/multisequence imaging with and without intravenous contrast.  INTRAVENOUS CONTRAST: 6.5 mL Gadavist. 1 mL discarded.  COMPARISON: MRI brain 4/8/2024. Outside MRI brain 3/12/2024.  CLINICAL INFORMATION: Non-small cell lung cancer metastatic to the brain. Systemic therapy.  IMAGING FINDINGS: Numerous lesions consistent with metastatic disease scattered throughout the supratentorial and infratentorial compartments are again demonstrated. The largest supratentorial le annetta demonstrated on study 3/12/2024 was a 7 mm x 5 mm lesion in the periphery of the right frontal lobe (image 20 series 29). That lesion was less well visualized on the study of 4/8/2024. Current study demonstrates a 5 mm x 3 mm (image 20 series 18). Largest lesion on study 4/8/2024 was a 6 mm x 4 mm lesion at the anterior limb of the internal capsule (image 17 series 17). On the current study that lesion measures 5 mm x 2 mm (image 70 series 14). Largest lesion in the posterior fossa on study 3/12/2024 was a medial left cerebellar lesion measuring 7 mm x 6 mm on image 8 series 29. That lesion was less well-seen on study 4/8/2024. On the current study the lesion measures 4 mm x 4 mm (image 7 series 18). Numerous additional lesions have also decreased in size. Some lesions are not significantly changed. No new or enlarging lesion is demonstrated.  There is no vasogenic edema surrounding the lesions. No hemorrhagic lesions are demonstrated. There are no lesions with high signal on diffusion imaging.  There is no hydrocephalus.  There is no extra-axial collection.  A small retention cyst is again demonstrated in the right maxillary sinus. There is mild ethmoid sinus mucosal thickening. There is marked nasal septum deviation to the right side.  Again demonstrated is minimal left mastoid air cell mucosal thickening.  No orbital abnormality is demonstrated.  No neoplastic replacement of bone marrow is demonstrated.    IMPRESSION: Improvement in numerous small supratentorial and infratentorial metastatic lesions. No new or enlarging lesion.  --- End of Report ---  ***Electronically Signed *** ----------------------------------------------- Landen Briones MD 05/07/24 1409  Dictated on 05/07/24

## 2024-05-13 NOTE — ASSESSMENT
[FreeTextEntry1] : I have discussed the natural history and treatment options for brain metastatic disease with the patient. I explained the indications for medical management with antiepileptic medications and steroids, chemotherapy, radiation therapy, radiosurgery and surgery. I explained the indications of a combination of these treatment options. I discussed the risks, benefits, possible complications and expected outcome related to each treatment option.  In the end, I recommend Gamma Knife Radiosurgery to treat the patient's brain metastases. After considering the options, the patient is leaning towards treatment with Gamma Knife radiosurgery. She will also discuss the options with Dr. Aragon, her Oncologist. I have provided a referral to Dr. Cristina Zamora, my partner in Radiation Oncology at [facility] to begin coordination of care.   The patient understands the plan of care and is in agreement.

## 2024-05-14 ENCOUNTER — APPOINTMENT (OUTPATIENT)
Dept: RADIATION ONCOLOGY | Facility: CLINIC | Age: 59
End: 2024-05-14
Payer: COMMERCIAL

## 2024-05-14 VITALS
HEIGHT: 65 IN | HEART RATE: 85 BPM | BODY MASS INDEX: 23.82 KG/M2 | OXYGEN SATURATION: 99 % | SYSTOLIC BLOOD PRESSURE: 104 MMHG | DIASTOLIC BLOOD PRESSURE: 74 MMHG | RESPIRATION RATE: 16 BRPM | WEIGHT: 143 LBS

## 2024-05-14 PROCEDURE — 99214 OFFICE O/P EST MOD 30 MIN: CPT

## 2024-05-14 RX ORDER — OXYCODONE 5 MG/1
5 TABLET ORAL
Qty: 56 | Refills: 0 | Status: DISCONTINUED | COMMUNITY
Start: 2024-03-13 | End: 2024-05-14

## 2024-05-14 RX ORDER — METOPROLOL TARTRATE 25 MG/1
25 TABLET, FILM COATED ORAL
Refills: 0 | Status: DISCONTINUED | COMMUNITY
End: 2024-05-14

## 2024-05-14 RX ORDER — TIZANIDINE 2 MG/1
2 TABLET ORAL
Qty: 90 | Refills: 2 | Status: DISCONTINUED | COMMUNITY
Start: 2024-04-03 | End: 2024-05-14

## 2024-05-14 RX ORDER — MIRTAZAPINE 7.5 MG/1
7.5 TABLET, FILM COATED ORAL
Qty: 30 | Refills: 2 | Status: DISCONTINUED | COMMUNITY
Start: 2024-03-13 | End: 2024-05-14

## 2024-05-14 RX ORDER — BUPRENORPHINE 5 UG/H
5 PATCH, EXTENDED RELEASE TRANSDERMAL
Qty: 4 | Refills: 0 | Status: DISCONTINUED | COMMUNITY
Start: 2024-04-03 | End: 2024-05-14

## 2024-05-14 RX ORDER — LIDOCAINE 40 MG/G
4 PATCH TOPICAL
Qty: 30 | Refills: 3 | Status: DISCONTINUED | COMMUNITY
Start: 2024-04-03 | End: 2024-05-14

## 2024-05-14 RX ORDER — DEXAMETHASONE 4 MG/1
4 TABLET ORAL
Qty: 30 | Refills: 1 | Status: DISCONTINUED | COMMUNITY
Start: 2024-03-27 | End: 2024-05-14

## 2024-05-14 RX ORDER — ONDANSETRON 4 MG/1
4 TABLET, ORALLY DISINTEGRATING ORAL
Qty: 30 | Refills: 3 | Status: DISCONTINUED | COMMUNITY
Start: 2024-03-27 | End: 2024-05-14

## 2024-05-14 RX ORDER — OSIMERTINIB 40 1/1
40 TABLET, FILM COATED ORAL
Qty: 30 | Refills: 3 | Status: DISCONTINUED | COMMUNITY
Start: 2024-03-27 | End: 2024-05-14

## 2024-05-15 NOTE — REASON FOR VISIT
[Consideration of Curative Therapy] : consideration of curative therapy for [Brain Metastasis] : brain metastasis [Consideration for Non-Curative Therapy] : consideration for non-curative therapy for

## 2024-05-15 NOTE — REVIEW OF SYSTEMS
[Fatigue] : fatigue [Confused] : no confusion [Dizziness] : no dizziness [Fainting] : no fainting [Difficulty Walking] : no difficulty walking [Negative] : Allergic/Immunologic [FreeTextEntry5] : heart palpitations

## 2024-05-15 NOTE — HISTORY OF PRESENT ILLNESS
[FreeTextEntry1] : Ms. Mckoy is a 59 year old female, diagnosed with Stage IV metastatic lung cancer with hepatic, adrenal, osseous and CNS metastasis, referred to our office for a consultation to discuss gamma knife treatment of her brain metastases. She is s/p RT to the lumbar spine (2700cGy in 3 fractions) in 4/22/24 with Dr. Alvarez at [facility].  Ms. Mckoy initially presented to the ER with complaints of lower back pain and palpitations on 2/20/24. CT Chest (2/20/24) demonstrated dense left apical mass measuring 3 cm in diameter. Multiple patchy nodular airspace opacities. Hyper-enhancing mass along the inferior aspect of hepatic segment IVb measuring 1.9 cm.  PET/CT (3/7/24) revealed hypermetabolic left apical malignancy with widespread osseous metastases. Hypermetabolic left lower paratracheal node, likely metastatic. Multiple bilateral pulmonary nodules measuring 7 mm without hypermetabolic activity - indeterminate for metastatic disease. Hypermetabolic focus between the liver and right adrenal gland favored to represent adrenal gland metastatic disease. 2.9 cm right thyroid nodule without hypermetabolic activity. Thyroid US recommended.  MRI Cervical Spine (3/12/24) finding suspicious for osseous metastases within the C2 posterior elements and T2 vertebral body. No epidural extension of disease. Multilevel cervical spine degenerative disease. Multiple small mestastases in the posterior fossa. MRI brain (3/12/24) demonstrated diffuse bilateral supra and infratentorial small enhancing nodular lesions compatible with metastasis. There was no associated mass effect or hemorrhage.  On 3/15/24, she underwent a biopsy of L5 and pathology revealed metastatic adenocarcinoma, consistent with lung primary, involving bone. PDL1 30% (positive). EGFR+.  On 3/18/24, MRI Lumbar Spine revealed: osseous metastatic disease was noted. Abscesses involving the L2 and L4 vertebral bodies protrude posteriorly.  04/08/24 - MRI brain was stable, similar diffuse innumerable multifocal intracranial mets in comparison to 3/12/24.  05/01/24 - repeat MRI of the brain showed improvement or stability of current lesions.  Ms. Mckoy is on Osimertinib / carbo/ Alimta (chemo x 4 cycles) based on JEANE trial managed by Dr. Aragon. She had a consultation with Dr. Canales on 5/13/24 to discuss gamma knife.  5/14/24 Ms Mckoy chemotherapy is currently on hold d/t thrombocytopenia and elevated LFTs.  She will have blood work 5/16 to see if she can resume 4th cycle.  Her appetite is fair and she denies nausea headaches dizziness vision changes or SOB.

## 2024-05-15 NOTE — PHYSICAL EXAM
[Outer Ear] : the ears and nose were normal in appearance [Hearing Threshold Finger Rub Not [place]] : hearing was normal [Examination Of The Oral Cavity] : the lips and gums were normal [Normal Oral Cavity] : oral cavity was normal [] : no respiratory distress [Respiration, Rhythm And Depth] : normal respiratory rhythm and effort [Exaggerated Use Of Accessory Muscles For Inspiration] : no accessory muscle use [Abdomen Soft] : soft [Nondistended] : nondistended [Abdomen Tenderness] : non-tender [Normal] : no focal deficits [No Focal Deficits] : no focal deficits [Sensation] : the sensory exam was normal to light touch and pinprick [Motor Exam] : the motor exam was normal [Oriented To Time, Place, And Person] : oriented to person, place, and time [Affect] : the affect was normal [de-identified] : Patient is tearful; she is upset that she had to delay chemotherapy due to her bloodwork and is worried her cancer is progressing while she is off therapy

## 2024-05-16 ENCOUNTER — RESULT REVIEW (OUTPATIENT)
Age: 59
End: 2024-05-16

## 2024-05-16 ENCOUNTER — APPOINTMENT (OUTPATIENT)
Dept: GERIATRICS | Facility: CLINIC | Age: 59
End: 2024-05-16
Payer: COMMERCIAL

## 2024-05-16 ENCOUNTER — APPOINTMENT (OUTPATIENT)
Dept: HEMATOLOGY ONCOLOGY | Facility: CLINIC | Age: 59
End: 2024-05-16
Payer: COMMERCIAL

## 2024-05-16 VITALS
DIASTOLIC BLOOD PRESSURE: 74 MMHG | WEIGHT: 143 LBS | TEMPERATURE: 97.2 F | HEART RATE: 75 BPM | HEIGHT: 65 IN | OXYGEN SATURATION: 97 % | SYSTOLIC BLOOD PRESSURE: 119 MMHG | RESPIRATION RATE: 16 BRPM | BODY MASS INDEX: 23.82 KG/M2

## 2024-05-16 VITALS
HEART RATE: 75 BPM | SYSTOLIC BLOOD PRESSURE: 119 MMHG | TEMPERATURE: 97.2 F | DIASTOLIC BLOOD PRESSURE: 74 MMHG | BODY MASS INDEX: 23.87 KG/M2 | HEIGHT: 65 IN | OXYGEN SATURATION: 97 % | WEIGHT: 143.25 LBS | RESPIRATION RATE: 16 BRPM

## 2024-05-16 DIAGNOSIS — F41.9 ANXIETY DISORDER, UNSPECIFIED: ICD-10-CM

## 2024-05-16 DIAGNOSIS — C34.90 MALIGNANT NEOPLASM OF UNSPECIFIED PART OF UNSPECIFIED BRONCHUS OR LUNG: ICD-10-CM

## 2024-05-16 DIAGNOSIS — Z51.5 ENCOUNTER FOR PALLIATIVE CARE: ICD-10-CM

## 2024-05-16 DIAGNOSIS — F32.A ANXIETY DISORDER, UNSPECIFIED: ICD-10-CM

## 2024-05-16 DIAGNOSIS — C79.31 MALIGNANT NEOPLASM OF UNSPECIFIED PART OF UNSPECIFIED BRONCHUS OR LUNG: ICD-10-CM

## 2024-05-16 DIAGNOSIS — C79.51 MALIGNANT NEOPLASM OF UNSPECIFIED PART OF UNSPECIFIED BRONCHUS OR LUNG: ICD-10-CM

## 2024-05-16 DIAGNOSIS — D50.0 IRON DEFICIENCY ANEMIA SECONDARY TO BLOOD LOSS (CHRONIC): ICD-10-CM

## 2024-05-16 DIAGNOSIS — F43.23 ADJUSTMENT DISORDER WITH MIXED ANXIETY AND DEPRESSED MOOD: ICD-10-CM

## 2024-05-16 PROCEDURE — 99213 OFFICE O/P EST LOW 20 MIN: CPT

## 2024-05-16 PROCEDURE — 99213 OFFICE O/P EST LOW 20 MIN: CPT | Mod: 25

## 2024-05-16 NOTE — ASSESSMENT
[FreeTextEntry1] : 59 y/o female with lung mass, widespread osseus metastasis, brain mets, back pain anxiety, depression, anorexia, insomnia, diarrhea no s/s of spinal compression overall feeling better today Back pain/ rt sided chest pain- resolved, not on oxycodone or tizanidine off buprenorphine patch 5 mcg q week\par Lidocaine patch topically to lower back- 12 h on, 12 h off  Anxiety/ depression/ anorexia-/ insomnia-mood and appetite better[facility] [id number]  f/up in 3 weeks. Full Code [Social support] : social support [Medication Management] : medication management [Patient/Caregiver is unclear of wishes] : patient/caregiver is unclear of wishes [Full Code] : Code Status: Full Code

## 2024-05-16 NOTE — PHYSICAL EXAM
[General Appearance - Alert] : alert [General Appearance - In No Acute Distress] : in no acute distress [General Appearance - Well Nourished] : well nourished [General Appearance - Well Developed] : well developed [General Appearance - Well-Appearing] : healthy appearing [Sclera] : the sclera and conjunctiva were normal [PERRL With Normal Accommodation] : pupils were equal in size, round, and reactive to light [Extraocular Movements] : extraocular movements were intact [Normal Oral Mucosa] : normal oral mucosa [Neck Appearance] : the appearance of the neck was normal [Respiration, Rhythm And Depth] : normal respiratory rhythm and effort [Auscultation Breath Sounds / Voice Sounds] : lungs were clear to auscultation bilaterally [Exaggerated Use Of Accessory Muscles For Inspiration] : no accessory muscle use [Apical Impulse] : the apical impulse was normal [Heart Rate And Rhythm] : heart rate was normal and rhythm regular [Bowel Sounds] : normal bowel sounds [Abdomen Soft] : soft [Abdomen Tenderness] : non-tender [] : no hepato-splenomegaly [Abnormal Walk] : normal gait [Skin Color & Pigmentation] : normal skin color and pigmentation [Cranial Nerves] : cranial nerves 2-12 were intact [Impaired Insight] : insight and judgment were intact [Oriented To Time, Place, And Person] : oriented to person, place, and time [Over the Past 2 Weeks, Have You Felt Down, Depressed, or Hopeless?] : 1.) Over the past 2 weeks, have you felt down, depressed, or hopeless? No [Over the Past 2 Weeks, Have You Felt Little Interest or Pleasure Doing Things?] : 2.) Over the past 2 weeks, have you felt little interest or pleasure doing things? No

## 2024-05-16 NOTE — HISTORY OF PRESENT ILLNESS
[FreeTextEntry1] : 57 y/o F referred for palliative management by Dr Hitchcock. PMHx of depression/anxiety- not on any meds. Pt's dad passed away from Lung cancer in 2023- she was cleaning out his home and felt back pain beginning of February and then developed palpitations- went ot ED 24- diagnosed with new onset A Fib- started on metoprolol and eliquis CT chest done to r/o PE- no PE but showed 3 cm irregular mass in NICANOR, possible mass liver PET scan- + lung mass 3.3 cm, 7 mm RML nodule, multiple additional subcm nodules, widespread osseus mets- C2, T6 pedicle, Left 8th rib, lytic lesion and endplate fx in L2, lesion L5, left iliac wing, left sacrum, rt iliac bone rt sup acetabulum, left ischial tuberosity.  Brain MRI positive for brain mets Social Hx- smoker age [age] then quit, now smokes occasionally, works as a , hx of recreational drug use in high school, occ ETOH  Family Hx of cancer- colon/ lung- dad sister- breast cancer  MRI imaging pending of the whole spine per Dr Hitchcock' s note Patient is very distraught with this life changing diagnosis. She lives alone and is independent in ADL/ambulation. She has 2 daughters- 28 and 26 years old. Her sister  of breast cancer 6-7 years ago She has a brother but unfortunately they have been estranged since her father's death last year. c/o back pain which sometimes radiates down the buttocks. She is taking ES tylenol/ salon pas for the pain which helps but does not take the pain away rt sided shock like chest pain 10/10 increases with movement, no pain at rest decreased appetite, difficulty sleeping, very anxious and depressed  3/12/2024: MRI Brain: New diffuse bilateral supratentorial and infratentorial small enhancing nodular lesions, compatible with mets.  3/14/2024: Biopsy L5 vertebral bone lesion: Metastatic adenocarcinoma, consistent with lung primary, involving bone. NGS PENDING  3/18/2024: MRI Lumbar Spine: Osseous mets are noted. Abscesses involving the L2 and L4 vertebral bodies protrude posteriorly. started Alimta 500mg /m2 x 1.74 =870 mg x 4 q 21 d Carboplatin AUC 6 - 704 mg x 4 Q 21 d Neulasta on pro 6 mg Osimertinib 80 mg PO daily.  Seen by Rad Onc Dr Alvarez- Next generation sequencing is pending. Depending on the results whole brain RT with hippocampal sparing may be able to be omitted or delayed. Ms. Mckoy may benefit from palliative RT to her lumbar spine.  Stereotactic radiation therapy was reviewed as part of the process for treatment. Specifically, SRS/SBRT is appropriate in order to provide an appropriately high radiation dose per fraction to the tumor. 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  with Osimertinib / carbo/ Alimta ( chemo x 4 cycles) based on FLAURA2 trial. Xgeva She was initially feeling better  She underwent radiation to brain/spine with improvement in back pain.  She had Plt: 39K and nose bleeds and had stopped the AC. She saw cardiology- apparently She feels that the BB made her dizzy and stopped it and felt better. She had some PARADA even before the cancer diagnosis. No CP/syncope/palpitations. EKG in sinus now. TTE 3/24 EF: 50% and no valvular disease. She has Zio patch on for monitor. Seen by JUANJOSE-  Gamma Knife Radiosurgery recommended to treat the patient's brain metastases. Since her intracranial disease improved on the MRI in May relative to the ones in April, she desires to hold off on GKRS until after her next cycle of chemotherapy. Patient to resume Tagrisso at a lower level starting today Feels well, nop pain, no headache, appetite fair, mood stable

## 2024-05-21 ENCOUNTER — APPOINTMENT (OUTPATIENT)
Dept: HEART AND VASCULAR | Facility: CLINIC | Age: 59
End: 2024-05-21
Payer: COMMERCIAL

## 2024-05-21 VITALS
BODY MASS INDEX: 23.32 KG/M2 | HEART RATE: 108 BPM | SYSTOLIC BLOOD PRESSURE: 125 MMHG | OXYGEN SATURATION: 98 % | TEMPERATURE: 98.7 F | HEIGHT: 65 IN | DIASTOLIC BLOOD PRESSURE: 70 MMHG | WEIGHT: 140 LBS

## 2024-05-21 DIAGNOSIS — I48.91 UNSPECIFIED ATRIAL FIBRILLATION: ICD-10-CM

## 2024-05-21 PROCEDURE — G2211 COMPLEX E/M VISIT ADD ON: CPT

## 2024-05-21 PROCEDURE — 99214 OFFICE O/P EST MOD 30 MIN: CPT

## 2024-05-21 RX ORDER — OSIMERTINIB 40 1/1
40 TABLET, FILM COATED ORAL
Refills: 0 | Status: ACTIVE | COMMUNITY

## 2024-05-23 ENCOUNTER — APPOINTMENT (OUTPATIENT)
Dept: HEMATOLOGY ONCOLOGY | Facility: CLINIC | Age: 59
End: 2024-05-23

## 2024-05-23 ENCOUNTER — RESULT REVIEW (OUTPATIENT)
Age: 59
End: 2024-05-23

## 2024-05-23 VITALS
OXYGEN SATURATION: 99 % | HEIGHT: 65 IN | RESPIRATION RATE: 16 BRPM | HEART RATE: 86 BPM | DIASTOLIC BLOOD PRESSURE: 61 MMHG | SYSTOLIC BLOOD PRESSURE: 102 MMHG | TEMPERATURE: 97.6 F | BODY MASS INDEX: 23.66 KG/M2 | WEIGHT: 142 LBS

## 2024-05-23 PROCEDURE — 93244 EXT ECG>48HR<7D REV&INTERPJ: CPT

## 2024-05-23 NOTE — HISTORY OF PRESENT ILLNESS
[FreeTextEntry1] : 60 YO F with lung cancer with brain mets S/P chemo/radiation, A fib on NOAC and toprol 25mg, prior smoker, no FH of early CAD/stroke who is here for F/U for the A fib. Patient had seen EP and has an qian by the end of the month. She had Plt: 39K and nose bleeds and had stopped the AC. She feels that the BB also made her dizzy and stopped it and felt better. She had some PARADA even before the cancer diagnosis. No CP/syncope/palpitations. EKG in sinus now.  TTE 3/24 EF: 50% and no valvular disease

## 2024-05-23 NOTE — DISCUSSION/SUMMARY
[EKG obtained to assist in diagnosis and management of assessed problem(s)] : EKG obtained to assist in diagnosis and management of assessed problem(s) [FreeTextEntry1] : 58 YO F with lung cancer with brain mets S/P chemo/radiation, A fib on NOAC and toprol 25mg, prior smoker, no FH of early CAD/stroke who is here for F/U for the A fib. Patient had seen EP and has an qian by the end of the month. She had Plt: 39K and nose bleeds and had stopped the AC. She feels that the BB also made her dizzy and stopped it and felt better. She had some PARADA even before the cancer diagnosis. No CP/syncope/palpitations. EKG in sinus now.  TTE 3/24 EF: 50% and no valvular disease  A fib not on AC/ BB  In sinus now  CHADS vasc2 :1 only a female. Very low risk for stroke  Will stop the AC at this point since she was already on it for the past 2-3 months.  F/U with EP. Holter: 9 beats of NSVT on Holter. Not triggered   SPECT for the PARADA Lipid panel /preventitive once patient is more stable

## 2024-05-23 NOTE — HISTORY OF PRESENT ILLNESS
[FreeTextEntry1] : 58 YO F with lung cancer with brain mets S/P chemo/radiation, A fib on NOAC and toprol 25mg, prior smoker, no FH of early CAD/stroke who is here for F/U for the A fib. Patient had seen EP and has an qian by the end of the month. She had Plt: 39K and nose bleeds and had stopped the AC. She feels that the BB also made her dizzy and stopped it and felt better. She had some PARADA even before the cancer diagnosis. No CP/syncope/palpitations. EKG in sinus now.  TTE 3/24 EF: 50% and no valvular disease

## 2024-05-28 ENCOUNTER — RESULT REVIEW (OUTPATIENT)
Age: 59
End: 2024-05-28

## 2024-05-28 ENCOUNTER — APPOINTMENT (OUTPATIENT)
Dept: HEMATOLOGY ONCOLOGY | Facility: CLINIC | Age: 59
End: 2024-05-28

## 2024-05-28 VITALS
DIASTOLIC BLOOD PRESSURE: 55 MMHG | BODY MASS INDEX: 24.69 KG/M2 | HEIGHT: 65 IN | WEIGHT: 148.19 LBS | OXYGEN SATURATION: 100 % | SYSTOLIC BLOOD PRESSURE: 110 MMHG | HEART RATE: 84 BPM | RESPIRATION RATE: 16 BRPM | TEMPERATURE: 97.3 F

## 2024-05-30 ENCOUNTER — RESULT REVIEW (OUTPATIENT)
Age: 59
End: 2024-05-30

## 2024-05-30 ENCOUNTER — APPOINTMENT (OUTPATIENT)
Dept: HEMATOLOGY ONCOLOGY | Facility: CLINIC | Age: 59
End: 2024-05-30

## 2024-05-30 VITALS
WEIGHT: 147 LBS | RESPIRATION RATE: 16 BRPM | OXYGEN SATURATION: 99 % | DIASTOLIC BLOOD PRESSURE: 64 MMHG | TEMPERATURE: 98.2 F | BODY MASS INDEX: 24.49 KG/M2 | SYSTOLIC BLOOD PRESSURE: 118 MMHG | HEIGHT: 65 IN | HEART RATE: 83 BPM

## 2024-05-30 NOTE — PHYSICAL EXAM
[No Acute Distress] : no acute distress [Normal Venous Pressure] : normal venous pressure [Normal S1, S2] : normal S1, S2 [No Murmur] : no murmur [Clear Lung Fields] : clear lung fields [No Respiratory Distress] : no respiratory distress  [Normal Gait] : normal gait [No Edema] : no edema [Moves all extremities] : moves all extremities [No Focal Deficits] : no focal deficits [Alert and Oriented] : alert and oriented

## 2024-06-05 PROBLEM — I48.91 AFIB: Status: ACTIVE | Noted: 2024-03-21

## 2024-06-05 NOTE — REVIEW OF SYSTEMS
[Feeling Fatigued] : feeling fatigued [Joint Pain] : joint pain [Negative] : Heme/Lymph [Fever] : no fever [Chills] : no chills [SOB] : no shortness of breath [Dyspnea on exertion] : not dyspnea during exertion [Chest Discomfort] : no chest discomfort [Lower Ext Edema] : no extremity edema [Palpitations] : no palpitations [Syncope] : no syncope [Cough] : no cough [Dizziness] : no dizziness

## 2024-06-05 NOTE — ADDENDUM
[FreeTextEntry1] : I, Nell Benoit, am scribing for and the presence of Dr. Smith the following sections: HPI, PMH,Family/social history, ROS, Physical Exam, Assessment / Plan.  I, Yoandy Smith, personally performed the services described in the documentation, reviewed the documentation recorded by the scribe in my presence and it accurately and completely records my words and actions.

## 2024-06-06 ENCOUNTER — NON-APPOINTMENT (OUTPATIENT)
Age: 59
End: 2024-06-06

## 2024-06-06 ENCOUNTER — RESULT REVIEW (OUTPATIENT)
Age: 59
End: 2024-06-06

## 2024-06-06 ENCOUNTER — APPOINTMENT (OUTPATIENT)
Dept: HEMATOLOGY ONCOLOGY | Facility: CLINIC | Age: 59
End: 2024-06-06
Payer: COMMERCIAL

## 2024-06-06 VITALS
HEIGHT: 65 IN | WEIGHT: 144.56 LBS | OXYGEN SATURATION: 98 % | HEART RATE: 81 BPM | TEMPERATURE: 97.1 F | RESPIRATION RATE: 16 BRPM | SYSTOLIC BLOOD PRESSURE: 124 MMHG | BODY MASS INDEX: 24.08 KG/M2 | DIASTOLIC BLOOD PRESSURE: 78 MMHG

## 2024-06-06 PROCEDURE — 99214 OFFICE O/P EST MOD 30 MIN: CPT

## 2024-06-07 NOTE — REVIEW OF SYSTEMS
[Diarrhea: Grade 0] : Diarrhea: Grade 0 [Negative] : Allergic/Immunologic [FreeTextEntry7] : burning with defecation post treatments  [Lower Ext Edema] : lower extremity edema [FreeTextEntry4] : minor postnasal drip [FreeTextEntry5] : minor

## 2024-06-07 NOTE — HISTORY OF PRESENT ILLNESS
[Disease: _____________________] : Disease: [unfilled] [T: ___] : T[unfilled] [M: ___] : M[unfilled] [de-identified] : 58-year-old female presents today for initial consultation of metastatic lung cancer, referred by Dr. Tinoco.  Patient presented to ED with complaints of lower back pain x 1 week and palpitations on 2024.  CT of the chest showed a 3cm irregular mass in the NICANOR, small infiltrates in the lungs, normal pleural and a possible mass in the medial aspect of the liver.  She was discharged home and underwent a PET CT.  Currently she is rating her lower back pain 6-7- improves with moving around, she admits to some weight loss since she found out of her diagnosis. She does have a dry cough, denies SOB.    PET CT 3/7/2024: Hypermetabolic left apical malignancy with widespread osseous mets Hypermetabolic left lower paratracheal node, likely metastatic Multiple bilateral pulmonary nodules measuring 7mm without hypermetabolic activity, likely below the resolution of PET, indeterminate for met disease Hypermetabolic focus between the liver and right adrenal gland favored to represent adrenal gland mets 2.9cm right thyroid nodule without hypermetabolic activity.  Rec thyroid US.    3/12/2024: MRI cervical spine: Findings suspicious for osseous mets within C2 posterior elements and T2 vertebral body.  Redemonstrated multiple small mets in the posterior fossa  3/12/2024: MRI Brain: New diffuse bilateral supratentorial and infratentorial small enhancing nodular lesions, compatible with mets.    3/14/2024: Biopsy  L5 vertebral bone lesion: Metastatic adenocarcinoma, consistent with lung primary, involving bone.  NGS PENDING   3/18/2024: MRI Lumbar Spine: Osseous mets are noted.  Abscesses involving the L2 and L4 vertebral bodies protrude posteriorly.    Social Hx- Former smoker, quit 29 yrs ago, smoked 1 PPD x 17 yrs Former ETOH use Denies recreational drug use , with 2 children Self employed   Family Hx- Sister  from met breast cancer 54 Father  met lung cancer in his 80s (smoker) Maternal auth with possible lung cancer  Maternal cousin  from esophageal cancer in his 50s Paternal uncle  with possible lung cancer   She never had genetic testing  Last Mammogram- 2024 Colonoscopy- 7 yrs ago Pap/pelvic- unsure     [de-identified] : Adenocarcinoma [de-identified] : Patient is here is for follow up for adenocarcinoma.  Patient overall feels well with no new issues. She stopped taking the Tagrisso 80mg on the week of May 7th, 2024 due to low platelets She was placed on 40mg of Tagrisso May 21st to the 28th, 2024 but stopped due to abnormal labs. She received a blood transfusion on May 30th, 2024.  Patient is having a follow up MRI of the brain 6/7/2024 and a PET scan next week.

## 2024-06-07 NOTE — ASSESSMENT
[Palliative] : Goals of care discussed with patient: Palliative [Palliative Care Plan] : not applicable at this time [FreeTextEntry1] : 59-year-old female referred by Dr. Aryan Tinoco for evaluation of newly diagnosed lung cancer st 4 with bone and brain metastasis. Patient is a former smoker- smoked since age 14 x 15 years. PETCT - bone metastasis, 3.3 cm left apical nodule. MRI brain with > 20 small mets. Patient had some back pain intermittently. Reviewed options for systemic therapy including chemotherapy based on Alimta/ carboplatin + targeted therapy based on the results of pathology report and NGS. Order [facility].  Palliative care referral  Results of [facility] revealed EGFR mutation L858R in 18%.  Patient offered systemic therapy with Osimertinib / carbo/ Alimta ( chemo x 4 cycles) based on FLAURA2 trial.  Xgeva Given good penetrance of TKI into the brain tissue will defer WBRT and repeat brain MRI in 3 weeks  Treatment schema, side effects and monitoring parameters explained to patient.  Treatment at [facility]  Alimta 500mg /m2 x 1.74 =870 mg x 4 q 21 d Carboplatin AUC 6 - 704 mg x 4 Q 21 d Neulasta on pro 6 mg Osimertinib 80 mg PO daily  Cycle 1 4/3/24 Cycle 2 24 Cycle 4 24- PETCT scheduled, MRI scheduled for Saturday to follow up on brain mets.  Guardant 360 ordered- 2nd test  Tagrisso 80mg - May 7th, 2024 stopped due to low platelets She was placed on 40mg of Tagrisso May 21st to the 2024 but stopped due to abnormal labs. She received a blood transfusion on May 30th, 2024.   # Nausea- reviewed antiemetics, olanzapine and zofran  # CNS mets Repeat MRI brain in 3--4 weeks MRI 24- reviewed, stable No neuro symptoms - repeat MRI 24 - MRI scheduled -  - evaluated for GNR  # Rad onc and NS evaluation for tx - scheduled for RT back  #Patient offered genetic testing- sister with BCA -  age 50. Negative genetic testing

## 2024-06-08 ENCOUNTER — RESULT REVIEW (OUTPATIENT)
Age: 59
End: 2024-06-08

## 2024-06-14 ENCOUNTER — RESULT REVIEW (OUTPATIENT)
Age: 59
End: 2024-06-14

## 2024-06-14 ENCOUNTER — APPOINTMENT (OUTPATIENT)
Dept: HEMATOLOGY ONCOLOGY | Facility: CLINIC | Age: 59
End: 2024-06-14

## 2024-06-14 ENCOUNTER — APPOINTMENT (OUTPATIENT)
Dept: RADIATION ONCOLOGY | Facility: CLINIC | Age: 59
End: 2024-06-14
Payer: COMMERCIAL

## 2024-06-14 VITALS
OXYGEN SATURATION: 100 % | TEMPERATURE: 97.6 F | WEIGHT: 139.25 LBS | BODY MASS INDEX: 23.2 KG/M2 | RESPIRATION RATE: 16 BRPM | DIASTOLIC BLOOD PRESSURE: 71 MMHG | SYSTOLIC BLOOD PRESSURE: 106 MMHG | HEART RATE: 81 BPM | HEIGHT: 65 IN

## 2024-06-14 PROCEDURE — 99212 OFFICE O/P EST SF 10 MIN: CPT

## 2024-06-14 NOTE — ASSESSMENT
[Metastatic disease with local control] : Metastatic disease with local control [FreeTextEntry1] : Pending PET/CT 6/14/24

## 2024-06-14 NOTE — REASON FOR VISIT
[Consideration for Non-Curative Therapy] : consideration for non-curative therapy for [Brain Metastasis] : brain metastasis [Bone Metastasis] : bone metastasis [Lung Cancer] : lung cancer [Routine Follow-Up] : routine follow-up visit for

## 2024-06-14 NOTE — HISTORY OF PRESENT ILLNESS
[Home] : at home, [unfilled] , at the time of the visit. [Medical Office: ([facility])___] : at the medical office located in  [Verbal consent obtained from patient] : the patient, [unfilled] [FreeTextEntry1] :   Ms. Mckoy is a 59 year old female, diagnosed with metastatic lung cancer with hepatic, adrenal, osseous and CNS metastasis. She is status post palliative radiation therapy to the lumbar spine and present for her post treatment follow-up.   Ms. Mckoy presented to the ER with complaints of lower back pain and palpitations on 24. CT Chest (24) demonstrated dense left apical mass measuring 3 cm in diameter. Multiple patchy nodular airspace opacities. Hyper-enhancing mass along the inferior aspect of hepatic segment IVb measuring 1.9 cm.   -PET/CT (3/7/24) revealed hypermetabolic left apical malignancy with widespread osseous metastases. Hypermetabolic left lower paratracheal node, likely metastatic. Multiple bilateral pulmonary nodules measuring 7 mm without hypermetabolic activity - indeterminate for metastatic disease. Hypermetabolic focus between the liver and right adrenal gland favored to represent adrenal gland metastatic disease. 2.9 cm right thyroid nodule without hypermetabolic activity. Thyroid US recommended.  -MRI Cervical Spine (3/12/24) finding suspicious for osseous metastases within the C2 posterior elements and T2 vertebral body. No epidural extension of disease. Multilevel cervical spine degenerative disease. Multiple small mestastases in the posterior fossa.  -MRI brain (3/12/24) demonstrated diffuse bilateral supra and infratentorial small enhnancing nodular lesions compatible with metastasis.There was no associated mass effect or hemorrhage.  On 3/15/24, she underwent  a biopsy  of L5 and pathology revealed metastatic adenocarcinoma, consistent with lung primary, involving bone. On 3/18/24, MRI Lumbar Spine revealed: osseous metastatic disease was noted. Abscesses involving the L2 and L4 vertebral bodies protrude posteriorly.  She was started on oxycodone 5 MG by Dr. Nayak, which she reports is effective for approximately 1 hour. She was also given mirtazapine for sleep. She reports her pain in the right chest to be a 10 / 10 on the pain scale, which is aggravated with the lifting of her arms. Her lower back pain with exertion is reported to be a 10 / 10 with sitting to standing motions or prolonged sitting. She denies any cough, shortness of breath, hemoptysis, phlegm, weight loss and appetite.   Ms. Mckoy is a former smoker, who quit 29 years ago, smoked 1 PPD x 17 years ago. No alcohol use. She has family history of a sister  from met breast cancer 54, father  met lung cancer in his 80s (smoker), Maternal aunt with possible lung cancer, maternal cousin  from esophageal cancer in his 50s, and paternal uncle  with possible lung cancer.   Ms. Mckoy completed palliative stereotactic radiation therapy to the lumbar spine to a total dose of 27 Gy in 3 fractions on 24. She tolerated treatment well with minimal side effects. She reported since starting radiation therapy she had noticed an improvement in her back pain. She reports an average pain of 3 - 4 out of 10 on the pain scale. She reports it is bearable at this time. She does not take any pain medication. She underwent MRI BRAIN WAW IC (24) demonstrated improvement in numerous small supratentorial and infratentorial metastatic lesions. No new or enlarging lesion. Ms. Mckoy is on Osimertinib / carbo/ Alimta (chemo x 4 cycles) based on FLXIMENARA2 trial under the care of Dr. Aragon. She reports Tagrisso has been on hold due to abnormal labwork. She had a consultation with Dr. Canales on 24 to discuss gamma knife. She has a PET/CT today and had a Brain MRI on 24.

## 2024-06-17 ENCOUNTER — APPOINTMENT (OUTPATIENT)
Dept: HEMATOLOGY ONCOLOGY | Facility: CLINIC | Age: 59
End: 2024-06-17

## 2024-06-17 ENCOUNTER — RESULT REVIEW (OUTPATIENT)
Age: 59
End: 2024-06-17

## 2024-06-17 VITALS
BODY MASS INDEX: 23.88 KG/M2 | HEIGHT: 65 IN | OXYGEN SATURATION: 99 % | SYSTOLIC BLOOD PRESSURE: 111 MMHG | HEART RATE: 105 BPM | WEIGHT: 143.31 LBS | DIASTOLIC BLOOD PRESSURE: 65 MMHG | RESPIRATION RATE: 16 BRPM | TEMPERATURE: 97.2 F

## 2024-06-18 ENCOUNTER — APPOINTMENT (OUTPATIENT)
Dept: NEUROSURGERY | Facility: HOSPITAL | Age: 59
End: 2024-06-18

## 2024-06-20 ENCOUNTER — APPOINTMENT (OUTPATIENT)
Dept: HEMATOLOGY ONCOLOGY | Facility: CLINIC | Age: 59
End: 2024-06-20

## 2024-06-20 ENCOUNTER — RESULT REVIEW (OUTPATIENT)
Age: 59
End: 2024-06-20

## 2024-06-20 VITALS
HEART RATE: 78 BPM | DIASTOLIC BLOOD PRESSURE: 69 MMHG | BODY MASS INDEX: 23.87 KG/M2 | WEIGHT: 143.25 LBS | HEIGHT: 65 IN | SYSTOLIC BLOOD PRESSURE: 111 MMHG | RESPIRATION RATE: 16 BRPM | OXYGEN SATURATION: 100 % | TEMPERATURE: 97.5 F

## 2024-06-23 ENCOUNTER — NON-APPOINTMENT (OUTPATIENT)
Age: 59
End: 2024-06-23

## 2024-06-24 ENCOUNTER — APPOINTMENT (OUTPATIENT)
Dept: HEMATOLOGY ONCOLOGY | Facility: CLINIC | Age: 59
End: 2024-06-24
Payer: COMMERCIAL

## 2024-06-24 ENCOUNTER — RESULT REVIEW (OUTPATIENT)
Age: 59
End: 2024-06-24

## 2024-06-24 VITALS
WEIGHT: 143 LBS | HEART RATE: 84 BPM | TEMPERATURE: 97.9 F | OXYGEN SATURATION: 99 % | HEIGHT: 65 IN | SYSTOLIC BLOOD PRESSURE: 116 MMHG | DIASTOLIC BLOOD PRESSURE: 60 MMHG | RESPIRATION RATE: 16 BRPM | BODY MASS INDEX: 23.82 KG/M2

## 2024-06-24 DIAGNOSIS — D63.8 ANEMIA IN OTHER CHRONIC DISEASES CLASSIFIED ELSEWHERE: ICD-10-CM

## 2024-06-24 DIAGNOSIS — D69.6 THROMBOCYTOPENIA, UNSPECIFIED: ICD-10-CM

## 2024-06-24 DIAGNOSIS — R74.01 ELEVATION OF LEVELS OF LIVER TRANSAMINASE LEVELS: ICD-10-CM

## 2024-06-24 DIAGNOSIS — A60.1 HERPESVIRAL INFECTION OF PERIANAL SKIN AND RECTUM: ICD-10-CM

## 2024-06-24 DIAGNOSIS — C34.92 MALIGNANT NEOPLASM OF UNSPECIFIED PART OF LEFT BRONCHUS OR LUNG: ICD-10-CM

## 2024-06-24 PROCEDURE — 99214 OFFICE O/P EST MOD 30 MIN: CPT

## 2024-06-24 RX ORDER — VALACYCLOVIR 1 G/1
1 TABLET, FILM COATED ORAL 3 TIMES DAILY
Qty: 30 | Refills: 0 | Status: ACTIVE | COMMUNITY
Start: 2024-06-24 | End: 1900-01-01

## 2024-06-24 NOTE — ASSESSMENT
[Palliative] : Goals of care discussed with patient: Palliative [Palliative Care Plan] : not applicable at this time [FreeTextEntry1] : 59-year-old female referred by Dr. Aryan Tinoco for evaluation of newly diagnosed lung cancer st 4 with bone and brain metastasis. Patient is a former smoker- smoked since age 14 x 15 years. PETCT - bone metastasis, 3.3 cm left apical nodule. MRI brain with > 20 small mets. Patient had some back pain intermittently. Reviewed options for systemic therapy including chemotherapy based on Alimta/ carboplatin + targeted therapy based on the results of pathology report and NGS. Order [facility].  Palliative care referral  Results of [facility] revealed EGFR mutation L858R in 18%.  Patient offered systemic therapy with Osimertinib / carbo/ Alimta ( chemo x 4 cycles) based on FLAURA2 trial.  Xgeva Given good penetrance of TKI into the brain tissue will defer WBRT and repeat brain MRI in 3 weeks  Treatment schema, side effects and monitoring parameters explained to patient.  Treatment at [facility]  Alimta 500mg /m2 x 1.74 =870 mg x 4 q 21 d Carboplatin AUC 6 - 704 mg x 4 Q 21 d Neulasta on pro 6 mg Osimertinib 80 mg PO daily  Cycle 1-4  24- PETCT scheduled, MRI scheduled for Saturday to follow up on brain mets.  Guardant 360 ordered- 2nd test  Tagrisso 80mg - May 7th, 2024 stopped due to low platelets She was placed on 40mg of Tagrisso May 21st to the 2024 but stopped due to abnormal labs. She received a blood transfusion on May 30th, 2024.  Resume tagrisso 40 mg 24, check CBC, transaminase in 2 weeks  # Nausea- reviewed antiemetics, olanzapine and zofran  # CNS mets Repeat MRI brain in 3--4 weeks MRI 24- reviewed, stable No neuro symptoms - repeat MRI 24 - MRI scheduled - - stable, improving shrinkage of the brain mets - evaluated for GNR  # Rad onc and NS evaluation for tx - scheduled for RT back  # HSV infection- stop Valtrex   #Patient offered genetic testing- sister with BCA -  age 50. Negative genetic testing

## 2024-06-24 NOTE — HISTORY OF PRESENT ILLNESS
[Disease: _____________________] : Disease: [unfilled] [T: ___] : T[unfilled] [M: ___] : M[unfilled] [de-identified] : 58-year-old female presents today for initial consultation of metastatic lung cancer, referred by Dr. Tinoco.  Patient presented to ED with complaints of lower back pain x 1 week and palpitations on 2024.  CT of the chest showed a 3cm irregular mass in the NICANOR, small infiltrates in the lungs, normal pleural and a possible mass in the medial aspect of the liver.  She was discharged home and underwent a PET CT.  Currently she is rating her lower back pain 6-7- improves with moving around, she admits to some weight loss since she found out of her diagnosis. She does have a dry cough, denies SOB.    PET CT 3/7/2024: Hypermetabolic left apical malignancy with widespread osseous mets Hypermetabolic left lower paratracheal node, likely metastatic Multiple bilateral pulmonary nodules measuring 7mm without hypermetabolic activity, likely below the resolution of PET, indeterminate for met disease Hypermetabolic focus between the liver and right adrenal gland favored to represent adrenal gland mets 2.9cm right thyroid nodule without hypermetabolic activity.  Rec thyroid US.    3/12/2024: MRI cervical spine: Findings suspicious for osseous mets within C2 posterior elements and T2 vertebral body.  Redemonstrated multiple small mets in the posterior fossa  3/12/2024: MRI Brain: New diffuse bilateral supratentorial and infratentorial small enhancing nodular lesions, compatible with mets.    3/14/2024: Biopsy  L5 vertebral bone lesion: Metastatic adenocarcinoma, consistent with lung primary, involving bone.  NGS PENDING   3/18/2024: MRI Lumbar Spine: Osseous mets are noted.  Abscesses involving the L2 and L4 vertebral bodies protrude posteriorly.    Social Hx- Former smoker, quit 29 yrs ago, smoked 1 PPD x 17 yrs Former ETOH use Denies recreational drug use , with 2 children Self employed   Family Hx- Sister  from met breast cancer 54 Father  met lung cancer in his 80s (smoker) Maternal auth with possible lung cancer  Maternal cousin  from esophageal cancer in his 50s Paternal uncle  with possible lung cancer   She never had genetic testing  Last Mammogram- 2024 Colonoscopy- 7 yrs ago Pap/pelvic- unsure     [de-identified] : Adenocarcinoma [de-identified] : Patient is here is for follow up for adenocarcinoma. Patient overall feels well with no new issues except for some minor shortness of breath. She had an MRI of the brain June 8th, 2024 and the PET scan June 14th, 2024. She stopped taking the Tagrisso 80mg on the week of May 7th, 2024 due to low platelets She was placed on 40mg of Tagrisso May 21st to the 28th, 2024 but stopped due to abnormal labs. She received a blood transfusion on May 30th, 2024.

## 2024-06-24 NOTE — REVIEW OF SYSTEMS
[Lower Ext Edema] : lower extremity edema [Diarrhea: Grade 0] : Diarrhea: Grade 0 [FreeTextEntry4] : minor postnasal drip [FreeTextEntry5] : minor  [SOB on Exertion] : shortness of breath during exertion [Negative] : ENT [FreeTextEntry6] : minor shortness of breath

## 2024-06-26 PROBLEM — C34.90 EGFR-RELATED LUNG CANCER: Status: ACTIVE | Noted: 2024-03-27

## 2024-06-26 PROBLEM — D50.0 ANEMIA DUE TO CHRONIC BLOOD LOSS: Status: ACTIVE | Noted: 2024-04-25

## 2024-06-26 PROBLEM — C34.90 LUNG CANCER METASTATIC TO BRAIN: Status: ACTIVE | Noted: 2024-03-13

## 2024-06-26 PROBLEM — C34.90 NON-SMALL CELL LUNG CANCER METASTATIC TO BONE: Status: ACTIVE | Noted: 2024-04-16

## 2024-06-26 PROBLEM — C34.90 NON-SMALL CELL LUNG CANCER METASTATIC TO BRAIN: Status: ACTIVE | Noted: 2024-05-15

## 2024-06-26 NOTE — REVIEW OF SYSTEMS
[Diarrhea: Grade 0] : Diarrhea: Grade 0 [Negative] : Allergic/Immunologic [FreeTextEntry4] : postnasal drip [FreeTextEntry7] : burning with defecation post treatments

## 2024-06-26 NOTE — HISTORY OF PRESENT ILLNESS
[Disease: _____________________] : Disease: [unfilled] [T: ___] : T[unfilled] [M: ___] : M[unfilled] [de-identified] : 58-year-old female presents today for initial consultation of metastatic lung cancer, referred by Dr. Tinoco.  Patient presented to ED with complaints of lower back pain x 1 week and palpitations on 2024.  CT of the chest showed a 3cm irregular mass in the NICANOR, small infiltrates in the lungs, normal pleural and a possible mass in the medial aspect of the liver.  She was discharged home and underwent a PET CT.  Currently she is rating her lower back pain 6-7- improves with moving around, she admits to some weight loss since she found out of her diagnosis. She does have a dry cough, denies SOB.    PET CT 3/7/2024: Hypermetabolic left apical malignancy with widespread osseous mets Hypermetabolic left lower paratracheal node, likely metastatic Multiple bilateral pulmonary nodules measuring 7mm without hypermetabolic activity, likely below the resolution of PET, indeterminate for met disease Hypermetabolic focus between the liver and right adrenal gland favored to represent adrenal gland mets 2.9cm right thyroid nodule without hypermetabolic activity.  Rec thyroid US.    3/12/2024: MRI cervical spine: Findings suspicious for osseous mets within C2 posterior elements and T2 vertebral body.  Redemonstrated multiple small mets in the posterior fossa  3/12/2024: MRI Brain: New diffuse bilateral supratentorial and infratentorial small enhancing nodular lesions, compatible with mets.    3/14/2024: Biopsy  L5 vertebral bone lesion: Metastatic adenocarcinoma, consistent with lung primary, involving bone.  NGS PENDING   3/18/2024: MRI Lumbar Spine: Osseous mets are noted.  Abscesses involving the L2 and L4 vertebral bodies protrude posteriorly.    Social Hx- Former smoker, quit 29 yrs ago, smoked 1 PPD x 17 yrs Former ETOH use Denies recreational drug use , with 2 children Self employed   Family Hx- Sister  from met breast cancer 54 Father  met lung cancer in his 80s (smoker) Maternal auth with possible lung cancer  Maternal cousin  from esophageal cancer in his 50s Paternal uncle  with possible lung cancer   She never had genetic testing  Last Mammogram- 2024 Colonoscopy- 7 yrs ago Pap/pelvic- unsure     [de-identified] : Adenocarcinoma [de-identified] : Patient is here is for follow up for adenocarcinoma. Patient overall feels well with no new issues; she states that she has been having a lot of post nasal drip. She states that it started developed post radiation.  Patient had a brain MRI which revealed similar diffuse innumerable multifocal intracranial metastases in comparison to 3/12/2024 when allowing for differences in technique..  Patient is schedule for another brain MRI on May 1st, 2024.

## 2024-06-26 NOTE — ASSESSMENT
[Palliative] : Goals of care discussed with patient: Palliative [Palliative Care Plan] : not applicable at this time [FreeTextEntry1] : 58-year-old female referred by Dr. Aryan Tinoco for evaluation of newly diagnosed lung cancer st 4 with bone and brain metastasis. Patient is a former smoker- smoked since age 14 x 15 years. PETCT - bone metastasis, 3.3 cm left apical nodule. MRI brain with > 20 small mets. Patient had some back pain intermittently. Reviewed options for systemic therapy including chemotherapy based on Alimta/ carboplatin + targeted therapy based on the results of pathology report and NGS. [facility].  Palliative care referral  Results of [facility] revealed EGFR mutation L858R in 18%.  Patient offered systemic therapy with Osimertinib / carbo/ Alimta ( chemo x 4 cycles) based on FLAURA2 trial.  Xgeva Given good penetrance of TKI into the brain tissue will defer WBRT and repeat brain MRI in 3 weeks  Treatment schema, side effects and monitoring parameters explained to patient.  Treatment at [place]  Alimta 500mg /m2 x 1.74 =870 mg x 4 q 21 d Carboplatin AUC 6 - 704 mg x 4 Q 21 d Neulasta on pro 6 mg Osimertinib 80 mg PO daily  Cycle 1 4/3/24 Cycle 2 24  # Nausea- reviewed antiemetics, olanzapine and zifran  # CNS mets Repeat MRI brain in 3--4 weeks MRI 24- reviewed, stable No neuro symptoms - repeat MRI 24  # Rad onc and NS evaluation for tx - scheduled for RT back  #Patient offered genetic testing- sister with BCA -  age 50. Negative genetic testing

## 2024-06-28 ENCOUNTER — NON-APPOINTMENT (OUTPATIENT)
Age: 59
End: 2024-06-28

## 2024-06-30 ENCOUNTER — RESULT REVIEW (OUTPATIENT)
Age: 59
End: 2024-06-30

## 2024-07-11 ENCOUNTER — APPOINTMENT (OUTPATIENT)
Dept: HEMATOLOGY ONCOLOGY | Facility: CLINIC | Age: 59
End: 2024-07-11

## 2024-07-11 ENCOUNTER — RESULT REVIEW (OUTPATIENT)
Age: 59
End: 2024-07-11

## 2024-07-11 VITALS
SYSTOLIC BLOOD PRESSURE: 121 MMHG | RESPIRATION RATE: 16 BRPM | HEART RATE: 75 BPM | OXYGEN SATURATION: 99 % | HEIGHT: 65 IN | WEIGHT: 140 LBS | DIASTOLIC BLOOD PRESSURE: 70 MMHG | BODY MASS INDEX: 23.32 KG/M2 | TEMPERATURE: 97.3 F

## 2024-07-18 ENCOUNTER — APPOINTMENT (OUTPATIENT)
Dept: HEMATOLOGY ONCOLOGY | Facility: CLINIC | Age: 59
End: 2024-07-18

## 2024-08-07 ENCOUNTER — RESULT REVIEW (OUTPATIENT)
Age: 59
End: 2024-08-07

## 2024-08-15 ENCOUNTER — RESULT REVIEW (OUTPATIENT)
Age: 59
End: 2024-08-15

## 2024-08-15 ENCOUNTER — NON-APPOINTMENT (OUTPATIENT)
Age: 59
End: 2024-08-15

## 2024-08-15 ENCOUNTER — APPOINTMENT (OUTPATIENT)
Dept: HEMATOLOGY ONCOLOGY | Facility: CLINIC | Age: 59
End: 2024-08-15
Payer: COMMERCIAL

## 2024-08-15 VITALS
HEIGHT: 65 IN | DIASTOLIC BLOOD PRESSURE: 70 MMHG | OXYGEN SATURATION: 100 % | SYSTOLIC BLOOD PRESSURE: 140 MMHG | RESPIRATION RATE: 16 BRPM | HEART RATE: 78 BPM | WEIGHT: 141 LBS | TEMPERATURE: 97.4 F | BODY MASS INDEX: 23.49 KG/M2

## 2024-08-15 DIAGNOSIS — K29.70 GASTRITIS, UNSPECIFIED, W/OUT BLEEDING: ICD-10-CM

## 2024-08-15 DIAGNOSIS — C79.31 MALIGNANT NEOPLASM OF UNSPECIFIED PART OF UNSPECIFIED BRONCHUS OR LUNG: ICD-10-CM

## 2024-08-15 DIAGNOSIS — C34.90 MALIGNANT NEOPLASM OF UNSPECIFIED PART OF UNSPECIFIED BRONCHUS OR LUNG: ICD-10-CM

## 2024-08-15 DIAGNOSIS — I48.91 UNSPECIFIED ATRIAL FIBRILLATION: ICD-10-CM

## 2024-08-15 DIAGNOSIS — D50.0 IRON DEFICIENCY ANEMIA SECONDARY TO BLOOD LOSS (CHRONIC): ICD-10-CM

## 2024-08-15 DIAGNOSIS — M54.9 DORSALGIA, UNSPECIFIED: ICD-10-CM

## 2024-08-15 DIAGNOSIS — C79.51 MALIGNANT NEOPLASM OF UNSPECIFIED PART OF UNSPECIFIED BRONCHUS OR LUNG: ICD-10-CM

## 2024-08-15 DIAGNOSIS — D69.6 THROMBOCYTOPENIA, UNSPECIFIED: ICD-10-CM

## 2024-08-15 PROCEDURE — 99214 OFFICE O/P EST MOD 30 MIN: CPT

## 2024-08-15 RX ORDER — FAMOTIDINE 40 MG/1
40 TABLET, FILM COATED ORAL DAILY
Qty: 30 | Refills: 3 | Status: ACTIVE | COMMUNITY
Start: 2024-08-15 | End: 1900-01-01

## 2024-08-15 NOTE — REVIEW OF SYSTEMS
[Diarrhea: Grade 0] : Diarrhea: Grade 0 [Negative] : Allergic/Immunologic [FreeTextEntry6] : minor shortness of breath [Abdominal Pain] : abdominal pain [Joint Pain] : joint pain [Muscle Pain] : muscle pain [SOB on Exertion] : no shortness of breath during exertion [FreeTextEntry7] : tenderness [FreeTextEntry9] : back pain: lumbar area

## 2024-08-15 NOTE — ASSESSMENT
[Palliative] : Goals of care discussed with patient: Palliative [Palliative Care Plan] : not applicable at this time [FreeTextEntry1] : 59-year-old female referred by Dr. Aryan Tinoco for evaluation of newly diagnosed lung cancer st 4 with bone and brain metastasis. Patient is a former smoker- smoked since age 14 x 15 years. PETCT - bone metastasis, 3.3 cm left apical nodule. MRI brain with > 20 small mets. Patient had some back pain intermittently. Reviewed options for systemic therapy including chemotherapy based on Alimta/ carboplatin + targeted therapy based on the results of pathology report and NGS. [facility].  Palliative care referral  Results of [facility] revealed EGFR mutation L858R in 18%.  Patient offered systemic therapy with Osimertinib / carbo/ Alimta ( chemo x 4 cycles) based on FLAURA2 trial.  Zometa Given good penetrance of TKI into the brain tissue will defer WBRT and repeat brain MRI in 3 weeks  Treatment schema, side effects and monitoring parameters explained to patient.  Treatment at [place]  Alimta 500mg /m2 x 1.74 =870 mg x 4 q 21 d Carboplatin AUC 6 - 704 mg x 4 Q 21 d Neulasta on pro 6 mg Osimertinib 80 mg PO daily  Cycle 1-4  24- PETCT scheduled, MRI scheduled for Saturday to follow up on brain mets.  Hi 360  2nd test- with CR  Tagrisso 80mg - May 7th, 2024 stopped due to low platelets She was placed on 40mg of Tagrisso May 21st to the 2024 but stopped due to abnormal labs. She received a blood transfusion on May 30th, 2024.  Resume tagrisso 40 mg 24, check CBC, transaminase in 2 weeks  CEA -46.8>20.6> 8.6>4.3  # Nausea- reviewed antiemetics, olanzapine and zofran  # Gastritis- Pepcid trial  # Thrombocytopenia 80 k- Tagrisso   # Back pain - MRI t/l ordered - doesnt want anything for pain  # Anemia - secondary to tx - iron WNL, received B12 with chemo  # Bone mets - Zometa  - dental screening done  # Afib- stopped Eliquis by cardio - stopped   # CNS mets Repeat MRI brain in 3--4 weeks MRI 24- reviewed, stable No neuro symptoms - repeat MRI 24 - MRI scheduled - - stable, improving shrinkage of the brain mets - evaluated for GNR - MRI brain 24- near complete resolution. - Dr. Dorsey evaluation  # Rad onc and NS evaluation for tx - scheduled for RT back  # HSV infection- stop Valtrex   #Patient offered genetic testing- sister with BCA -  age 50. Negative genetic testing  # [facility]   # Thyroid nodules- biopsy done before

## 2024-08-15 NOTE — HISTORY OF PRESENT ILLNESS
[Disease: _____________________] : Disease: [unfilled] [T: ___] : T[unfilled] [M: ___] : M[unfilled] [de-identified] : 58-year-old female presents today for initial consultation of metastatic lung cancer, referred by Dr. Tinoco.  Patient presented to ED with complaints of lower back pain x 1 week and palpitations on 2024.  CT of the chest showed a 3cm irregular mass in the NICANOR, small infiltrates in the lungs, normal pleural and a possible mass in the medial aspect of the liver.  She was discharged home and underwent a PET CT.  Currently she is rating her lower back pain 6-7- improves with moving around, she admits to some weight loss since she found out of her diagnosis. She does have a dry cough, denies SOB.    PET CT 3/7/2024: Hypermetabolic left apical malignancy with widespread osseous mets Hypermetabolic left lower paratracheal node, likely metastatic Multiple bilateral pulmonary nodules measuring 7mm without hypermetabolic activity, likely below the resolution of PET, indeterminate for met disease Hypermetabolic focus between the liver and right adrenal gland favored to represent adrenal gland mets 2.9cm right thyroid nodule without hypermetabolic activity.  Rec thyroid US.    3/12/2024: MRI cervical spine: Findings suspicious for osseous mets within C2 posterior elements and T2 vertebral body.  Redemonstrated multiple small mets in the posterior fossa  3/12/2024: MRI Brain: New diffuse bilateral supratentorial and infratentorial small enhancing nodular lesions, compatible with mets.    3/14/2024: Biopsy  L5 vertebral bone lesion: Metastatic adenocarcinoma, consistent with lung primary, involving bone.  NGS PENDING   3/18/2024: MRI Lumbar Spine: Osseous mets are noted.  Abscesses involving the L2 and L4 vertebral bodies protrude posteriorly.    Social Hx- Former smoker, quit 29 yrs ago, smoked 1 PPD x 17 yrs Former ETOH use Denies recreational drug use , with 2 children Self employed   Family Hx- Sister  from met breast cancer 54 Father  met lung cancer in his 80s (smoker) Maternal auth with possible lung cancer  Maternal cousin  from esophageal cancer in his 50s Paternal uncle  with possible lung cancer   She never had genetic testing  Last Mammogram- 2024 Colonoscopy- 7 yrs ago Pap/pelvic- unsure     [de-identified] : Adenocarcinoma [de-identified] : Patient is here is for follow up for adenocarcinoma. Patient overall feels well except for soreness in her back. She had a recent MRI of the brain which revealed a positive response: near complete resolution of previously described enhancing intra-axial nodules with a 2mm faint residual enhancing focus right precentral gyrus. No New increasing enhancing intra-axial mass of abnormal laptomeningeal meningeal enhancement. She had a PET scan on June 15th, 2024: IMPRESSION:  1. Interval decrease in size and uptake of the left apical lung mass, SUV max 9.3, consistent with known malignancy and a favorable response to treatment.  2. Scattered bony lesions without increased uptake, suggestive of treated lesions. No new sites concerning for FDG avid metastatic disease are identified. She is still on tagrisso 80mg.   3. 3.5 cm right thyroid nodule. Biopsy is recommended, if not recently performed.

## 2024-08-15 NOTE — ASSESSMENT
[Palliative] : Goals of care discussed with patient: Palliative [Palliative Care Plan] : not applicable at this time [FreeTextEntry1] : 59-year-old female referred by Dr. Aryan Tinoco for evaluation of newly diagnosed lung cancer st 4 with bone and brain metastasis. Patient is a former smoker- smoked since age 14 x 15 years. PETCT - bone metastasis, 3.3 cm left apical nodule. MRI brain with > 20 small mets. Patient had some back pain intermittently. Reviewed options for systemic therapy including chemotherapy based on Alimta/ carboplatin + targeted therapy based on the results of pathology report and NGS. [facility].  Palliative care referral  Results of [facility] revealed EGFR mutation L858R in 18%.  Patient offered systemic therapy with Osimertinib / carbo/ Alimta ( chemo x 4 cycles) based on FLAURA2 trial.  Zometa Given good penetrance of TKI into the brain tissue will defer WBRT and repeat brain MRI in 3 weeks  Treatment schema, side effects and monitoring parameters explained to patient.  Treatment at [place]  Alimta 500mg /m2 x 1.74 =870 mg x 4 q 21 d Carboplatin AUC 6 - 704 mg x 4 Q 21 d Neulasta on pro 6 mg Osimertinib 80 mg PO daily  Cycle 1-4  24- PETCT scheduled, MRI scheduled for Saturday to follow up on brain mets.  Hi 360  2nd test- with CR  Tagrisso 80mg - May 7th, 2024 stopped due to low platelets She was placed on 40mg of Tagrisso May 21st to the 2024 but stopped due to abnormal labs. She received a blood transfusion on May 30th, 2024.  Resume tagrisso 40 mg 24, check CBC, transaminase in 2 weeks  CEA -46.8>20.6> 8.6>4.3  # Nausea- reviewed antiemetics, olanzapine and zofran  # Gastritis- Pepcid trial  # Thrombocytopenia 80 k- Tagrisso   # Back pain - MRI t/l ordered - doesnt want anything for pain  # Anemia - secondary to tx - iron WNL, received B12 with chemo  # Bone mets - Zometa  - dental screening done  # Afib- stopped Eliquis by cardio - stopped   # CNS mets Repeat MRI brain in 3--4 weeks MRI 24- reviewed, stable No neuro symptoms - repeat MRI 24 - MRI scheduled - - stable, improving shrinkage of the brain mets - evaluated for GNR - MRI brain 24- near complete resolution. - Dr. Dorsey evaluation  # Rad onc and NS evaluation for tx - scheduled for RT back  # HSV infection- stop Valtrex   #Patient offered genetic testing- sister with BCA -  age 50. Negative genetic testing  # [facility]   # Thyroid nodules- biopsy done before

## 2024-08-15 NOTE — HISTORY OF PRESENT ILLNESS
[Disease: _____________________] : Disease: [unfilled] [T: ___] : T[unfilled] [M: ___] : M[unfilled] [de-identified] : 58-year-old female presents today for initial consultation of metastatic lung cancer, referred by Dr. Tinoco.  Patient presented to ED with complaints of lower back pain x 1 week and palpitations on 2024.  CT of the chest showed a 3cm irregular mass in the NICANOR, small infiltrates in the lungs, normal pleural and a possible mass in the medial aspect of the liver.  She was discharged home and underwent a PET CT.  Currently she is rating her lower back pain 6-7- improves with moving around, she admits to some weight loss since she found out of her diagnosis. She does have a dry cough, denies SOB.    PET CT 3/7/2024: Hypermetabolic left apical malignancy with widespread osseous mets Hypermetabolic left lower paratracheal node, likely metastatic Multiple bilateral pulmonary nodules measuring 7mm without hypermetabolic activity, likely below the resolution of PET, indeterminate for met disease Hypermetabolic focus between the liver and right adrenal gland favored to represent adrenal gland mets 2.9cm right thyroid nodule without hypermetabolic activity.  Rec thyroid US.    3/12/2024: MRI cervical spine: Findings suspicious for osseous mets within C2 posterior elements and T2 vertebral body.  Redemonstrated multiple small mets in the posterior fossa  3/12/2024: MRI Brain: New diffuse bilateral supratentorial and infratentorial small enhancing nodular lesions, compatible with mets.    3/14/2024: Biopsy  L5 vertebral bone lesion: Metastatic adenocarcinoma, consistent with lung primary, involving bone.  NGS PENDING   3/18/2024: MRI Lumbar Spine: Osseous mets are noted.  Abscesses involving the L2 and L4 vertebral bodies protrude posteriorly.    Social Hx- Former smoker, quit 29 yrs ago, smoked 1 PPD x 17 yrs Former ETOH use Denies recreational drug use , with 2 children Self employed   Family Hx- Sister  from met breast cancer 54 Father  met lung cancer in his 80s (smoker) Maternal auth with possible lung cancer  Maternal cousin  from esophageal cancer in his 50s Paternal uncle  with possible lung cancer   She never had genetic testing  Last Mammogram- 2024 Colonoscopy- 7 yrs ago Pap/pelvic- unsure     [de-identified] : Adenocarcinoma [de-identified] : Patient is here is for follow up for adenocarcinoma. Patient overall feels well except for soreness in her back. She had a recent MRI of the brain which revealed a positive response: near complete resolution of previously described enhancing intra-axial nodules with a 2mm faint residual enhancing focus right precentral gyrus. No New increasing enhancing intra-axial mass of abnormal laptomeningeal meningeal enhancement. She had a PET scan on June 15th, 2024: IMPRESSION:  1. Interval decrease in size and uptake of the left apical lung mass, SUV max 9.3, consistent with known malignancy and a favorable response to treatment.  2. Scattered bony lesions without increased uptake, suggestive of treated lesions. No new sites concerning for FDG avid metastatic disease are identified. She is still on tagrisso 80mg.   3. 3.5 cm right thyroid nodule. Biopsy is recommended, if not recently performed.

## 2024-08-29 ENCOUNTER — APPOINTMENT (OUTPATIENT)
Dept: HEMATOLOGY ONCOLOGY | Facility: CLINIC | Age: 59
End: 2024-08-29

## 2024-08-30 ENCOUNTER — NON-APPOINTMENT (OUTPATIENT)
Age: 59
End: 2024-08-30

## 2024-09-04 ENCOUNTER — RESULT REVIEW (OUTPATIENT)
Age: 59
End: 2024-09-04

## 2024-09-05 ENCOUNTER — APPOINTMENT (OUTPATIENT)
Dept: HEMATOLOGY ONCOLOGY | Facility: CLINIC | Age: 59
End: 2024-09-05
Payer: COMMERCIAL

## 2024-09-05 VITALS
RESPIRATION RATE: 16 BRPM | HEART RATE: 75 BPM | BODY MASS INDEX: 23.69 KG/M2 | OXYGEN SATURATION: 100 % | WEIGHT: 142.2 LBS | TEMPERATURE: 97.9 F | DIASTOLIC BLOOD PRESSURE: 74 MMHG | SYSTOLIC BLOOD PRESSURE: 124 MMHG | HEIGHT: 65 IN

## 2024-09-05 DIAGNOSIS — C79.31 SECONDARY MALIGNANT NEOPLASM OF BRAIN: ICD-10-CM

## 2024-09-05 PROCEDURE — 99215 OFFICE O/P EST HI 40 MIN: CPT

## 2024-09-11 PROBLEM — C79.31 METASTATIC ADENOCARCINOMA TO BRAIN: Status: ACTIVE | Noted: 2024-09-11

## 2024-09-11 NOTE — HISTORY OF PRESENT ILLNESS
[Disease: _____________________] : Disease: [unfilled] [de-identified] : 57 yo F with    58-year-old female presents today for initial consultation of metastatic lung cancer, referred by Dr. Tinoco. Patient presented to ED with complaints of lower back pain x 1 week and palpitations on 2024. CT of the chest showed a 3cm irregular mass in the NICANOR, small infiltrates in the lungs, normal pleural and a possible mass in the medial aspect of the liver. She was discharged home and underwent a PET CT. Currently she is rating her lower back pain 6-7- improves with moving around, she admits to some weight loss since she found out of her diagnosis. She does have a dry cough, denies SOB.  PET CT 3/7/2024: Hypermetabolic left apical malignancy with widespread osseous mets Hypermetabolic left lower paratracheal node, likely metastatic Multiple bilateral pulmonary nodules measuring 7mm without hypermetabolic activity, likely below the resolution of PET, indeterminate for met disease Hypermetabolic focus between the liver and right adrenal gland favored to represent adrenal gland mets 2.9cm right thyroid nodule without hypermetabolic activity. Rec thyroid US.  3/12/2024: MRI cervical spine: Findings suspicious for osseous mets within C2 posterior elements and T2 vertebral body. Redemonstrated multiple small mets in the posterior fossa  3/12/2024: MRI Brain: New diffuse bilateral supratentorial and infratentorial small enhancing nodular lesions, compatible with mets.  3/14/2024: Biopsy L5 vertebral bone lesion: Metastatic adenocarcinoma, consistent with lung primary, involving bone. NGS PENDING  3/18/2024: MRI Lumbar Spine: Osseous mets are noted. Abscesses involving the L2 and L4 vertebral bodies protrude posteriorly.  Social Hx- Former smoker, quit 29 yrs ago, smoked 1 PPD x 17 yrs Former ETOH use Denies recreational drug use , with 2 children Self employed   Family Hx- Sister  from met breast cancer 54 Father  met lung cancer in his 80s (smoker) Maternal auth with possible lung cancer Maternal cousin  from esophageal cancer in his 50s Paternal uncle  with possible lung cancer  She never had genetic testing Last Mammogram- 2024 Colonoscopy- 7 yrs ago Pap/pelvic- unsure.     2024: MRIB:  1. Positive response: Near-complete resolution of previously described enhancing intra-axial nodules, with a 2 mm faint residual enhancing focus right precentral gyrus (series 16 image 40). No new/increasing enhancing intra-axial mass or abnormal leptomeningeal enhancement. Continued surveillance recommended.  2. Additional findings described in detail above.  2024: MRI T-spine: Metastatic deposit C2 vertebral body, unchanged. T7 inferior endplate Schmorl's node, unchanged. No new enhancing lesions are identified within the thoracic spine. MRI L-spine: Findings are consistent with the patient's known metastatic disease, similar to the prior study. No new lesions are identified.   Degenerative changes of the lumbar spine. No evidence of spinal canal stenosis.  [de-identified] : EGFR L858R, PIK3CA F5209S, PTEN D24fs, TP53 V157F, RIT1 F82L, EGFR amplification [de-identified] : Onc:  [FreeTextEntry1] : s/p 4 cycles carboplatin/pemetrexed+ Tagrisso on Tagrisso 40 mg daily (dose reduced due to abnormal labs) [de-identified] : She reports intermittent back pain.  sent her for an MRI T-spine and L-spine. This did not reveal any LM enahcnemnet, but notable disc prolapse. She denies any ataxia, bladder or bowel dysfunction. [90: Able to carry normal activity; minor signs or symptoms of disease.] : 90: Able to carry normal activity; minor signs or symptoms of disease.  [ECOG Performance Status: 1 - Restricted in physically strenuous activity but ambulatory and able to carry out work of a light or sedentary nature] : Performance Status: 1 - Restricted in physically strenuous activity but ambulatory and able to carry out work of a light or sedentary nature, e.g., light house work, office work

## 2024-09-11 NOTE — ASSESSMENT
[FreeTextEntry1] : 57 yo F with EGFR L858R with multiple brain metastases here for initial evaluation.   Brain metastases - multiple mets with miliary distribution initially, excellent response to chemo+Tagrisso per FLAURA2 trial, most recent MRI brain with near complete response. She has some paraspinal muscle tenderness on exam. She underwent MRI T and L spine for further evaluation. Disc prolapse noted, which could be causing some of her pain. --she needed a Tagrisso interruption due to cytopenias, and is currently on 40 mg daily per , which she has been tolerating well --I independently reviewed images of MRI brain and spine, excelelnt response, no evidence of LMD --I discussed the plan with  as well, summary being the plan is to continue Tagrisso for now. The patient is high risk for LMD due to intiial peripheral distribution of BMs and multiple lesions --would keep MRIB monitoring at Q 2 months - order for next MRI placed --will discuss with  re: possible NS evaluation for her spine issues --no labs needed next visit

## 2024-09-12 ENCOUNTER — APPOINTMENT (OUTPATIENT)
Dept: HEMATOLOGY ONCOLOGY | Facility: CLINIC | Age: 59
End: 2024-09-12
Payer: COMMERCIAL

## 2024-09-12 ENCOUNTER — RESULT REVIEW (OUTPATIENT)
Age: 59
End: 2024-09-12

## 2024-09-12 VITALS
BODY MASS INDEX: 23.39 KG/M2 | SYSTOLIC BLOOD PRESSURE: 123 MMHG | HEART RATE: 71 BPM | OXYGEN SATURATION: 100 % | DIASTOLIC BLOOD PRESSURE: 76 MMHG | WEIGHT: 140.38 LBS | TEMPERATURE: 97.3 F | RESPIRATION RATE: 16 BRPM | HEIGHT: 65 IN

## 2024-09-12 DIAGNOSIS — M54.9 DORSALGIA, UNSPECIFIED: ICD-10-CM

## 2024-09-12 DIAGNOSIS — C34.90 MALIGNANT NEOPLASM OF UNSPECIFIED PART OF UNSPECIFIED BRONCHUS OR LUNG: ICD-10-CM

## 2024-09-12 DIAGNOSIS — C34.92 MALIGNANT NEOPLASM OF UNSPECIFIED PART OF LEFT BRONCHUS OR LUNG: ICD-10-CM

## 2024-09-12 DIAGNOSIS — C79.51 MALIGNANT NEOPLASM OF UNSPECIFIED PART OF UNSPECIFIED BRONCHUS OR LUNG: ICD-10-CM

## 2024-09-12 DIAGNOSIS — F32.A ANXIETY DISORDER, UNSPECIFIED: ICD-10-CM

## 2024-09-12 DIAGNOSIS — D50.0 IRON DEFICIENCY ANEMIA SECONDARY TO BLOOD LOSS (CHRONIC): ICD-10-CM

## 2024-09-12 DIAGNOSIS — C79.31 MALIGNANT NEOPLASM OF UNSPECIFIED PART OF UNSPECIFIED BRONCHUS OR LUNG: ICD-10-CM

## 2024-09-12 DIAGNOSIS — F41.9 ANXIETY DISORDER, UNSPECIFIED: ICD-10-CM

## 2024-09-12 PROCEDURE — 99214 OFFICE O/P EST MOD 30 MIN: CPT

## 2024-09-12 NOTE — BEGINNING OF VISIT
[0] : 2) Feeling down, depressed, or hopeless: Not at all (0) [PHQ-2 Negative] : PHQ-2 Negative [[id number]] : 0 [Pain Scale: ___] : On a scale of 1-10, today the patient's pain is a(n) [unfilled]. [Future Reassessment of Pain Scale] : Future reassessment of pain scale [Medication(s)] : Medication(s) [Never] : Never [Date Discussed (MM/DD/YY): ___] : Discussed: [unfilled] [Patient/Caregiver not ready to engage] : Patient/Caregiver not ready to engage

## 2024-09-12 NOTE — HISTORY OF PRESENT ILLNESS
[Disease: _____________________] : Disease: [unfilled] [T: ___] : T[unfilled] [M: ___] : M[unfilled] [de-identified] : 58-year-old female presents today for initial consultation of metastatic lung cancer, referred by Dr. Tinoco.  Patient presented to ED with complaints of lower back pain x 1 week and palpitations on 2024.  CT of the chest showed a 3cm irregular mass in the NICANOR, small infiltrates in the lungs, normal pleural and a possible mass in the medial aspect of the liver.  She was discharged home and underwent a PET CT.  Currently she is rating her lower back pain 6-7- improves with moving around, she admits to some weight loss since she found out of her diagnosis. She does have a dry cough, denies SOB.    PET CT 3/7/2024: Hypermetabolic left apical malignancy with widespread osseous mets Hypermetabolic left lower paratracheal node, likely metastatic Multiple bilateral pulmonary nodules measuring 7mm without hypermetabolic activity, likely below the resolution of PET, indeterminate for met disease Hypermetabolic focus between the liver and right adrenal gland favored to represent adrenal gland mets 2.9cm right thyroid nodule without hypermetabolic activity.  Rec thyroid US.    3/12/2024: MRI cervical spine: Findings suspicious for osseous mets within C2 posterior elements and T2 vertebral body.  Redemonstrated multiple small mets in the posterior fossa  3/12/2024: MRI Brain: New diffuse bilateral supratentorial and infratentorial small enhancing nodular lesions, compatible with mets.    3/14/2024: Biopsy  L5 vertebral bone lesion: Metastatic adenocarcinoma, consistent with lung primary, involving bone.  NGS PENDING   3/18/2024: MRI Lumbar Spine: Osseous mets are noted.  Abscesses involving the L2 and L4 vertebral bodies protrude posteriorly.    Social Hx- Former smoker, quit 29 yrs ago, smoked 1 PPD x 17 yrs Former ETOH use Denies recreational drug use , with 2 children Self employed   Family Hx- Sister  from met breast cancer 54 Father  met lung cancer in his 80s (smoker) Maternal auth with possible lung cancer  Maternal cousin  from esophageal cancer in his 50s Paternal uncle  with possible lung cancer   She never had genetic testing  Last Mammogram- 2024 Colonoscopy- 7 yrs ago Pap/pelvic- unsure     [de-identified] : Adenocarcinoma [de-identified] : Pt presents today for follow up of met lung cancer, On Tagrisso, and zometa- tolerating.  Recently had consult with Dr. Dorsey

## 2024-09-12 NOTE — BEGINNING OF VISIT
[0] : 2) Feeling down, depressed, or hopeless: Not at all (0) [PHQ-2 Negative] : PHQ-2 Negative [[id number]] : 0 [Pain Scale: ___] : On a scale of 1-10, today the patient's pain is a(n) [unfilled]. [Future Reassessment of Pain Scale] : Future reassessment of pain scale [Medication(s)] : Medication(s) [Never] : Never [Date Discussed (MM/DD/YY): ___] : Discussed: [unfilled] [Patient/Caregiver not ready to engage] : Patient/Caregiver not ready to engage

## 2024-09-12 NOTE — BEGINNING OF VISIT
[0] : 2) Feeling down, depressed, or hopeless: Not at all (0) [PHQ-2 Negative] : PHQ-2 Negative [[id number]] : 0 [Pain Scale: ___] : On a scale of 1-10, today the patient's pain is a(n) [unfilled]. [Future Reassessment of Pain Scale] : Future reassessment of pain scale [Medication(s)] : Medication(s) [Never] : Never [Date Discussed (MM/DD/YY): ___] : Discussed: [unfilled] [Patient/Caregiver not ready to engage] : Patient/Caregiver not ready to engage

## 2024-09-12 NOTE — HISTORY OF PRESENT ILLNESS
[Disease: _____________________] : Disease: [unfilled] [T: ___] : T[unfilled] [M: ___] : M[unfilled] [de-identified] : 58-year-old female presents today for initial consultation of metastatic lung cancer, referred by Dr. Tinoco.  Patient presented to ED with complaints of lower back pain x 1 week and palpitations on 2024.  CT of the chest showed a 3cm irregular mass in the NICANOR, small infiltrates in the lungs, normal pleural and a possible mass in the medial aspect of the liver.  She was discharged home and underwent a PET CT.  Currently she is rating her lower back pain 6-7- improves with moving around, she admits to some weight loss since she found out of her diagnosis. She does have a dry cough, denies SOB.    PET CT 3/7/2024: Hypermetabolic left apical malignancy with widespread osseous mets Hypermetabolic left lower paratracheal node, likely metastatic Multiple bilateral pulmonary nodules measuring 7mm without hypermetabolic activity, likely below the resolution of PET, indeterminate for met disease Hypermetabolic focus between the liver and right adrenal gland favored to represent adrenal gland mets 2.9cm right thyroid nodule without hypermetabolic activity.  Rec thyroid US.    3/12/2024: MRI cervical spine: Findings suspicious for osseous mets within C2 posterior elements and T2 vertebral body.  Redemonstrated multiple small mets in the posterior fossa  3/12/2024: MRI Brain: New diffuse bilateral supratentorial and infratentorial small enhancing nodular lesions, compatible with mets.    3/14/2024: Biopsy  L5 vertebral bone lesion: Metastatic adenocarcinoma, consistent with lung primary, involving bone.  NGS PENDING   3/18/2024: MRI Lumbar Spine: Osseous mets are noted.  Abscesses involving the L2 and L4 vertebral bodies protrude posteriorly.    Social Hx- Former smoker, quit 29 yrs ago, smoked 1 PPD x 17 yrs Former ETOH use Denies recreational drug use , with 2 children Self employed   Family Hx- Sister  from met breast cancer 54 Father  met lung cancer in his 80s (smoker) Maternal auth with possible lung cancer  Maternal cousin  from esophageal cancer in his 50s Paternal uncle  with possible lung cancer   She never had genetic testing  Last Mammogram- 2024 Colonoscopy- 7 yrs ago Pap/pelvic- unsure     [de-identified] : Adenocarcinoma [de-identified] : Pt presents today for follow up of met lung cancer, On Tagrisso, and zometa- tolerating.  Recently had consult with Dr. Dorsey

## 2024-09-12 NOTE — REVIEW OF SYSTEMS
[Abdominal Pain] : abdominal pain [Diarrhea: Grade 0] : Diarrhea: Grade 0 [Joint Pain] : joint pain [Muscle Pain] : muscle pain [Negative] : Allergic/Immunologic [SOB on Exertion] : no shortness of breath during exertion [FreeTextEntry7] : tenderness [FreeTextEntry9] : back pain: lumbar area

## 2024-09-12 NOTE — HISTORY OF PRESENT ILLNESS
[Disease: _____________________] : Disease: [unfilled] [T: ___] : T[unfilled] [M: ___] : M[unfilled] [de-identified] : 58-year-old female presents today for initial consultation of metastatic lung cancer, referred by Dr. Tinoco.  Patient presented to ED with complaints of lower back pain x 1 week and palpitations on 2024.  CT of the chest showed a 3cm irregular mass in the NICANOR, small infiltrates in the lungs, normal pleural and a possible mass in the medial aspect of the liver.  She was discharged home and underwent a PET CT.  Currently she is rating her lower back pain 6-7- improves with moving around, she admits to some weight loss since she found out of her diagnosis. She does have a dry cough, denies SOB.    PET CT 3/7/2024: Hypermetabolic left apical malignancy with widespread osseous mets Hypermetabolic left lower paratracheal node, likely metastatic Multiple bilateral pulmonary nodules measuring 7mm without hypermetabolic activity, likely below the resolution of PET, indeterminate for met disease Hypermetabolic focus between the liver and right adrenal gland favored to represent adrenal gland mets 2.9cm right thyroid nodule without hypermetabolic activity.  Rec thyroid US.    3/12/2024: MRI cervical spine: Findings suspicious for osseous mets within C2 posterior elements and T2 vertebral body.  Redemonstrated multiple small mets in the posterior fossa  3/12/2024: MRI Brain: New diffuse bilateral supratentorial and infratentorial small enhancing nodular lesions, compatible with mets.    3/14/2024: Biopsy  L5 vertebral bone lesion: Metastatic adenocarcinoma, consistent with lung primary, involving bone.  NGS PENDING   3/18/2024: MRI Lumbar Spine: Osseous mets are noted.  Abscesses involving the L2 and L4 vertebral bodies protrude posteriorly.    Social Hx- Former smoker, quit 29 yrs ago, smoked 1 PPD x 17 yrs Former ETOH use Denies recreational drug use , with 2 children Self employed   Family Hx- Sister  from met breast cancer 54 Father  met lung cancer in his 80s (smoker) Maternal auth with possible lung cancer  Maternal cousin  from esophageal cancer in his 50s Paternal uncle  with possible lung cancer   She never had genetic testing  Last Mammogram- 2024 Colonoscopy- 7 yrs ago Pap/pelvic- unsure     [de-identified] : Adenocarcinoma [de-identified] : Pt presents today for follow up of met lung cancer, On Tagrisso, and zometa- tolerating.  Recently had consult with Dr. Doresy

## 2024-09-12 NOTE — ASSESSMENT
[Palliative] : Goals of care discussed with patient: Palliative [Palliative Care Plan] : not applicable at this time [FreeTextEntry1] : 59-year-old female referred by Dr. Aryan Tinoco for evaluation of newly diagnosed lung cancer st 4 with bone and brain metastasis. Patient is a former smoker- smoked since age 14 x 15 years. PETCT - bone metastasis, 3.3 cm left apical nodule. MRI brain with > 20 small mets. Patient had some back pain intermittently. Reviewed options for systemic therapy including chemotherapy based on Alimta/ carboplatin + targeted therapy based on the results of pathology report and NGS. Order [facility].- EGFR undetectable   Palliative care referral  Results of [facility] revealed EGFR mutation L858R in 18%.  Patient offered systemic therapy with Osimertinib / carbo/ Alimta ( chemo x 4 cycles) based on FLAURA2 trial.  Zometa Given good penetrance of TKI into the brain tissue will defer WBRT and repeat brain MRI in 3 weeks  Treatment schema, side effects and monitoring parameters explained to patient.  Treatment at [facility]  Alimta 500mg /m2 x 1.74 =870 mg x 4 q 21 d Carboplatin AUC 6 - 704 mg x 4 Q 21 d Neulasta on pro 6 mg Osimertinib 80 mg PO daily  Cycle 1-4  24- PETCT scheduled, MRI scheduled for Saturday to follow up on brain mets.  Hi 360  2nd test- with CR  Tagrisso 80mg - May 7th, 2024 stopped due to low platelets She was placed on 40mg of Tagrisso May 21st to the 2024 but stopped due to abnormal labs. She received a blood transfusion on May 30th, 2024.  Resume tagrisso 40 mg 24, check CBC, transaminase in 2 weeks  CEA -46.8>20.6> 8.6>4.3>7.6  # Nausea- reviewed antiemetics, olanzapine and zofran  # Gastritis- Pepcid trial  # Thrombocytopenia 98 k- Tagrisso - no bleeding   # Back pain - MRI t/l ordered-Reviewed, stable bone lesions  - doesn't want anything for pain  # Anemia - secondary to tx - iron WNL, received B12 with chemo  # Bone mets - Zometa monthly, tolerates  - dental screening done  # Afib- stopped Eliquis by cardio - stopped   # CNS mets Repeat MRI brain in 3--4 weeks MRI 24- reviewed, stable No neuro symptoms - repeat MRI 24 - MRI scheduled - - stable, improving shrinkage of the brain mets - evaluated for GNR - MRI brain 24- near complete resolution. - Dr. Dorsey evaluation appreciated  - Due for PET CT now  #Patient offered genetic testing- sister with BCA -  age 50. Negative genetic testing  # [facility]   # Thyroid nodules- biopsy done before  Case and mgmt discussed with Dr. Aragon- return in 4 weeks reg labs, cea and Guardant

## 2024-09-12 NOTE — ASSESSMENT
[Palliative] : Goals of care discussed with patient: Palliative [Palliative Care Plan] : not applicable at this time [FreeTextEntry1] : 59-year-old female referred by Dr. Aryan Tinoco for evaluation of newly diagnosed lung cancer st 4 with bone and brain metastasis. Patient is a former smoker- smoked since age 14 x 15 years. PETCT - bone metastasis, 3.3 cm left apical nodule. MRI brain with > 20 small mets. Patient had some back pain intermittently. Reviewed options for systemic therapy including chemotherapy based on Alimta/ carboplatin + targeted therapy based on the results of pathology report and NGS. Order [facility].- EGFR undetectable   Palliative care referral  Results of [facility] revealed EGFR mutation L858R in 18%.  Patient offered systemic therapy with Osimertinib / carbo/ Alimta ( chemo x 4 cycles) based on FLAURA2 trial.  Zometa Given good penetrance of TKI into the brain tissue will defer WBRT and repeat brain MRI in 3 weeks  Treatment schema, side effects and monitoring parameters explained to patient.  Treatment at [place]  Alimta 500mg /m2 x 1.74 =870 mg x 4 q 21 d Carboplatin AUC 6 - 704 mg x 4 Q 21 d Neulasta on pro 6 mg Osimertinib 80 mg PO daily  Cycle 1-4  24- PETCT scheduled, MRI scheduled for Saturday to follow up on brain mets.  Hi 360  2nd test- with CR  Tagrisso 80mg - May 7th, 2024 stopped due to low platelets She was placed on 40mg of Tagrisso May 21st to the 2024 but stopped due to abnormal labs. She received a blood transfusion on May 30th, 2024.  Resume tagrisso 40 mg 24, check CBC, transaminase in 2 weeks  CEA -46.8>20.6> 8.6>4.3>7.6  # Nausea- reviewed antiemetics, olanzapine and zofran  # Gastritis- Pepcid trial  # Thrombocytopenia 98 k- Tagrisso - no bleeding   # Back pain - MRI t/l ordered-Reviewed, stable bone lesions  - doesn't want anything for pain  # Anemia - secondary to tx - iron WNL, received B12 with chemo  # Bone mets - Zometa monthly, tolerates  - dental screening done  # Afib- stopped Eliquis by cardio - stopped   # CNS mets Repeat MRI brain in 3--4 weeks MRI 24- reviewed, stable No neuro symptoms - repeat MRI 24 - MRI scheduled - - stable, improving shrinkage of the brain mets - evaluated for GNR - MRI brain 24- near complete resolution. - Dr. Dorsey evaluation appreciated  - Due for PET CT now  #Patient offered genetic testing- sister with BCA -  age 50. Negative genetic testing  # [facility]   # Thyroid nodules- biopsy done before  Case and mgmt discussed with Dr. Aragon- return in 4 weeks reg labs, cea and Guardant

## 2024-09-13 NOTE — REASON FOR VISIT
[Routine Follow-Up] : routine follow-up visit for [Brain Metastasis] : brain metastasis [Bone Metastasis] : bone metastasis [Lung Cancer] : lung cancer

## 2024-09-17 ENCOUNTER — APPOINTMENT (OUTPATIENT)
Dept: HEART AND VASCULAR | Facility: CLINIC | Age: 59
End: 2024-09-17

## 2024-09-17 ENCOUNTER — APPOINTMENT (OUTPATIENT)
Dept: RADIATION ONCOLOGY | Facility: CLINIC | Age: 59
End: 2024-09-17
Payer: COMMERCIAL

## 2024-09-17 ENCOUNTER — NON-APPOINTMENT (OUTPATIENT)
Age: 59
End: 2024-09-17

## 2024-09-17 VITALS
HEART RATE: 68 BPM | SYSTOLIC BLOOD PRESSURE: 115 MMHG | BODY MASS INDEX: 22.82 KG/M2 | TEMPERATURE: 98.7 F | OXYGEN SATURATION: 97 % | HEIGHT: 65 IN | DIASTOLIC BLOOD PRESSURE: 70 MMHG | WEIGHT: 137 LBS

## 2024-09-17 VITALS
RESPIRATION RATE: 18 BRPM | WEIGHT: 138.5 LBS | DIASTOLIC BLOOD PRESSURE: 79 MMHG | HEART RATE: 71 BPM | HEIGHT: 65 IN | SYSTOLIC BLOOD PRESSURE: 127 MMHG | OXYGEN SATURATION: 100 % | BODY MASS INDEX: 23.07 KG/M2

## 2024-09-17 DIAGNOSIS — I48.91 UNSPECIFIED ATRIAL FIBRILLATION: ICD-10-CM

## 2024-09-17 PROCEDURE — 99214 OFFICE O/P EST MOD 30 MIN: CPT

## 2024-09-17 PROCEDURE — G2211 COMPLEX E/M VISIT ADD ON: CPT

## 2024-09-17 PROCEDURE — 99213 OFFICE O/P EST LOW 20 MIN: CPT

## 2024-09-17 PROCEDURE — 93000 ELECTROCARDIOGRAM COMPLETE: CPT

## 2024-09-17 NOTE — VITALS
[Maximal Pain Intensity: 0/10] : 0/10 [100: Normal, no complaints, no evidence of disease.] : 100: Normal, no complaints, no evidence of disease. [ECOG Performance Status: 0 - Fully active, able to carry on all pre-disease performance without restriction] : Performance Status: 0 - Fully active, able to carry on all pre-disease performance without restriction [Maximal Pain Intensity: 6/10] : 6/10 [Least Pain Intensity: 0/10] : 0/10

## 2024-09-17 NOTE — HISTORY OF PRESENT ILLNESS
[FreeTextEntry1] : Ms. Mckoy is a 59 year old female, diagnosed with metastatic lung cancer with hepatic, adrenal, osseous and CNS metastasis. She is status post palliative radiation therapy to the lumbar spine and present for her follow-up.   Ms. Mckoy presented to the ER with complaints of lower back pain and palpitations on 24. CT Chest (24) demonstrated dense left apical mass measuring 3 cm in diameter. Multiple patchy nodular airspace opacities. Hyper-enhancing mass along the inferior aspect of hepatic segment IVb measuring 1.9 cm.   -PET/CT (3/7/24) revealed hypermetabolic left apical malignancy with widespread osseous metastases. Hypermetabolic left lower paratracheal node, likely metastatic. Multiple bilateral pulmonary nodules measuring 7 mm without hypermetabolic activity - indeterminate for metastatic disease. Hypermetabolic focus between the liver and right adrenal gland favored to represent adrenal gland metastatic disease. 2.9 cm right thyroid nodule without hypermetabolic activity. Thyroid US recommended.  -MRI Cervical Spine (3/12/24) finding suspicious for osseous metastases within the C2 posterior elements and T2 vertebral body. No epidural extension of disease. Multilevel cervical spine degenerative disease. Multiple small mestastases in the posterior fossa.  -MRI brain (3/12/24) demonstrated diffuse bilateral supra and infratentorial small enhnancing nodular lesions compatible with metastasis.There was no associated mass effect or hemorrhage.  On 3/15/24, she underwent  a biopsy  of L5 and pathology revealed metastatic adenocarcinoma, consistent with lung primary, involving bone. On 3/18/24, MRI Lumbar Spine revealed: osseous metastatic disease was noted. Abscesses involving the L2 and L4 vertebral bodies protrude posteriorly.  She was started on oxycodone 5 MG by Dr. Nayak, which she reports is effective for approximately 1 hour. She was also given mirtazapine for sleep. She reports her pain in the right chest to be a 10 / 10 on the pain scale, which is aggravated with the lifting of her arms. Her lower back pain with exertion is reported to be a 10 / 10 with sitting to standing motions or prolonged sitting. She denies any cough, shortness of breath, hemoptysis, phlegm, weight loss and appetite.   Ms. Mckoy is a former smoker, who quit 29 years ago, smoked 1 PPD x 17 years ago. No alcohol use. She has family history of a sister  from met breast cancer 54, father  met lung cancer in his 80s (smoker), Maternal aunt with possible lung cancer, maternal cousin  from esophageal cancer in his 50s, and paternal uncle  with possible lung cancer.   Ms. Mckoy completed palliative stereotactic radiation therapy to the lumbar spine to a total dose of 27 Gy in 3 fractions on 24. She tolerated treatment well with minimal side effects. She reported an average pain of 3 - 4 out of 10 on the pain scale.   She underwent MRI BRAIN WAW IC (24) demonstrated improvement in numerous small supratentorial and infratentorial metastatic lesions. No new or enlarging lesion. Ms. Mckoy is on Osimertinib / carbo/ Alimta (chemo x 4 cycles) based on FLAURA2 trial under the care of Dr. Araogn. She reports Tagrisso has been on hold due to abnormal lab work. She had a consultation with Dr. Canales on 24 to discuss gamma knife.    Ms. Mckoy presents for her routine follow-up. She underwent the following restaging imaging.    PET [facility] ONC FDG SUBS (24)  1. Interval decrease in size and uptake of the left apical lung mass, SUV max 9.3, consistent with known malignancy and a favorable response to treatment.  2. Scattered bony lesions without increased uptake, suggestive of treated lesions. No new sites concerning for FDG avid metastatic disease are identified.  3. 3.5 cm right thyroid nodule. Biopsy is recommended, if not recently performed.  MRI Lumbar/THORACIC SPINE WAW IC (24) Findings are consistent with the patient's known metastatic disease. No new enhancing lesions are identified within the thoracic spine. Metastatic deposit C2 vertebral body, unchanged. T7 inferior endplate Schmorl's node, unchanged. No new enhancing lesions are identified within the thoracic spine.  She reports pain to the thoracic spine, rating it a 6 / 10 on the pain scale.  Sitting for long period of time, makes the pain worst. She does not take pain medication. She reports cervical neck pain occasionally and rates it a She does have the medical marijuana certificate but has been putting off getting it. She continues on Tagrisso and Zometa.

## 2024-09-17 NOTE — HISTORY OF PRESENT ILLNESS
[FreeTextEntry1] : Ms. Mckoy is a 59 year old female, diagnosed with metastatic lung cancer with hepatic, adrenal, osseous and CNS metastasis. She is status post palliative radiation therapy to the lumbar spine and present for her follow-up.   Ms. Mckoy presented to the ER with complaints of lower back pain and palpitations on 24. CT Chest (24) demonstrated dense left apical mass measuring 3 cm in diameter. Multiple patchy nodular airspace opacities. Hyper-enhancing mass along the inferior aspect of hepatic segment IVb measuring 1.9 cm.   -PET/CT (3/7/24) revealed hypermetabolic left apical malignancy with widespread osseous metastases. Hypermetabolic left lower paratracheal node, likely metastatic. Multiple bilateral pulmonary nodules measuring 7 mm without hypermetabolic activity - indeterminate for metastatic disease. Hypermetabolic focus between the liver and right adrenal gland favored to represent adrenal gland metastatic disease. 2.9 cm right thyroid nodule without hypermetabolic activity. Thyroid US recommended.  -MRI Cervical Spine (3/12/24) finding suspicious for osseous metastases within the C2 posterior elements and T2 vertebral body. No epidural extension of disease. Multilevel cervical spine degenerative disease. Multiple small mestastases in the posterior fossa.  -MRI brain (3/12/24) demonstrated diffuse bilateral supra and infratentorial small enhnancing nodular lesions compatible with metastasis.There was no associated mass effect or hemorrhage.  On 3/15/24, she underwent  a biopsy  of L5 and pathology revealed metastatic adenocarcinoma, consistent with lung primary, involving bone. On 3/18/24, MRI Lumbar Spine revealed: osseous metastatic disease was noted. Abscesses involving the L2 and L4 vertebral bodies protrude posteriorly.  She was started on oxycodone 5 MG by Dr. Nayak, which she reports is effective for approximately 1 hour. She was also given mirtazapine for sleep. She reports her pain in the right chest to be a 10 / 10 on the pain scale, which is aggravated with the lifting of her arms. Her lower back pain with exertion is reported to be a 10 / 10 with sitting to standing motions or prolonged sitting. She denies any cough, shortness of breath, hemoptysis, phlegm, weight loss and appetite.   Ms. Mckoy is a former smoker, who quit 29 years ago, smoked 1 PPD x 17 years ago. No alcohol use. She has family history of a sister  from met breast cancer 54, father  met lung cancer in his 80s (smoker), Maternal aunt with possible lung cancer, maternal cousin  from esophageal cancer in his 50s, and paternal uncle  with possible lung cancer.   Ms. Mckoy completed palliative stereotactic radiation therapy to the lumbar spine to a total dose of 27 Gy in 3 fractions on 24. She tolerated treatment well with minimal side effects. She reported an average pain of 3 - 4 out of 10 on the pain scale.   She underwent MRI BRAIN WAW IC (24) demonstrated improvement in numerous small supratentorial and infratentorial metastatic lesions. No new or enlarging lesion. Ms. Mckoy is on Osimertinib / carbo/ Alimta (chemo x 4 cycles) based on FLAURA2 trial under the care of Dr. Aragon. She reports Tagrisso has been on hold due to abnormal lab work. She had a consultation with Dr. Canales on 24 to discuss gamma knife.    Ms. Mckoy presents for her routine follow-up. She underwent the following restaging imaging.    PET [facility] ONC FDG SUBS (24)  1. Interval decrease in size and uptake of the left apical lung mass, SUV max 9.3, consistent with known malignancy and a favorable response to treatment.  2. Scattered bony lesions without increased uptake, suggestive of treated lesions. No new sites concerning for FDG avid metastatic disease are identified.  3. 3.5 cm right thyroid nodule. Biopsy is recommended, if not recently performed.  MRI Lumbar/THORACIC SPINE WAW IC (24) Findings are consistent with the patient's known metastatic disease. No new enhancing lesions are identified within the thoracic spine. Metastatic deposit C2 vertebral body, unchanged. T7 inferior endplate Schmorl's node, unchanged. No new enhancing lesions are identified within the thoracic spine.  She reports pain to the thoracic spine, rating it a 6 / 10 on the pain scale.  Sitting for long period of time, makes the pain worst. She does not take pain medication. She reports cervical neck pain occasionally and rates it a She does have the medical marijuana certificate but has been putting off getting it. She continues on Tagrisso and Zometa.

## 2024-09-20 NOTE — HISTORY OF PRESENT ILLNESS
[FreeTextEntry1] : 60yo Former smoker, evaluated at [facility] ER for left chest pain and back pain and was found to be in Afib w/ RVR.  A CT scan was performed showing a left apical lung mass and patchy nodular opacities suspicious for metastatic disease, as well as 1.9cm liver mass.  While in the ED she converted to sinus rhythm.  She was started on metoprolol and Eliquis and discharged home.  She since started following with an oncologist and diagnosed with Adenocarcinoma of the lung with mets to the bone and brain.  She is currently receiving chemo/radiation therapy and tolerating this well. She returns for follow up.  She is feeling well despite ongoing treatment of cancer.  She is not aware of any recurrent arrhythmia.  She denies palpitations, dizziness, syncope, SOB, chest pain, LE swelling, orthopnea.   TTE 3/2024: normal LV function w/ no significant valve disease.  EKG 5/10/24: sinus rhythm 65bpm Event monitor 5/2024: underlying sinus rhythm ([id number]), 1 NSVT lasting 9 beats, rare PACs/PVCs.  no Afib.  EKG 9/17/24: NSR 63bpm

## 2024-09-20 NOTE — DISCUSSION/SUMMARY
Per Nursing Note:  Payal Chan is a 25 y.o. female returns for an annual exam          Chief Complaint   Patient presents with    Annual Exam         Patient's last menstrual period was 2024 (exact date).  Her periods are moderate in flow and usually regular with a 26-32 day interval with 3-7 day duration.  She does not have dysmenorrhea.  Problems: no problems  Birth Control: condoms .  Last Pap: today will be her first  With regard to the Gardisil vaccine, she has received all 3 injections         Annual exam      Payal Chan is a ,  25 y.o. female   Patient's last menstrual period was 2024 (exact date).  She presents for her annual checkup.   LV=23     PCOS.  Seeing endo.  Has appt in May.    Is on metformin 500mg in PM, spironolactone  Endo also follows thyroid    Menstrual status:    Periods regular  Every monthly  Duration: 3-4d  Flow: avg  Dysmenorrhea: mild.  Takes ibpfn prn  She does nothave premenstrual symptoms.      Contraception:    The current method of family planning is condoms every time.      Sexual history:    She  reports being sexually active and has had partner(s) who are male. She reports using the following method of birth control/protection: None.      Pap Smear:  Her most recent Pap smear was normal, HPV was not tested, obtained 2020.    She does not  have a history of abnormal paps.             Past Medical History:   Diagnosis Date    ADD (attention deficit disorder)     Anxiety     Depression with anxiety     HPV vaccine counseling     completed series    PCOS (polycystic ovarian syndrome) 2023    Routine Papanicolaou smear 2020    normal     History reviewed. No pertinent surgical history.  OB History    Para Term  AB Living   0 0 0 0 0 0   SAB IAB Ectopic Molar Multiple Live Births   0 0 0 0 0 0         Current Outpatient Medications   Medication Sig Dispense Refill    escitalopram (LEXAPRO) 20 MG tablet TAKE 1 TABLET BY MOUTH  [EKG obtained to assist in diagnosis and management of assessed problem(s)] : EKG obtained to assist in diagnosis and management of assessed problem(s)

## 2024-09-20 NOTE — REVIEW OF SYSTEMS
[Negative] : Heme/Lymph [Joint Pain] : joint pain [Fever] : no fever [Chills] : no chills [Feeling Fatigued] : not feeling fatigued [SOB] : no shortness of breath [Dyspnea on exertion] : not dyspnea during exertion [Chest Discomfort] : no chest discomfort [Lower Ext Edema] : no extremity edema [Palpitations] : no palpitations [Syncope] : no syncope [Cough] : no cough [Dizziness] : no dizziness

## 2024-09-23 ENCOUNTER — RESULT REVIEW (OUTPATIENT)
Age: 59
End: 2024-09-23

## 2024-09-30 ENCOUNTER — RESULT REVIEW (OUTPATIENT)
Age: 59
End: 2024-09-30

## 2024-10-03 ENCOUNTER — APPOINTMENT (OUTPATIENT)
Dept: HEMATOLOGY ONCOLOGY | Facility: CLINIC | Age: 59
End: 2024-10-03

## 2024-10-03 VITALS
HEART RATE: 69 BPM | RESPIRATION RATE: 18 BRPM | WEIGHT: 141.1 LBS | OXYGEN SATURATION: 95 % | BODY MASS INDEX: 23.51 KG/M2 | HEIGHT: 65 IN | DIASTOLIC BLOOD PRESSURE: 77 MMHG | TEMPERATURE: 97.7 F | SYSTOLIC BLOOD PRESSURE: 122 MMHG

## 2024-10-03 DIAGNOSIS — C34.92 MALIGNANT NEOPLASM OF UNSPECIFIED PART OF LEFT BRONCHUS OR LUNG: ICD-10-CM

## 2024-10-03 PROCEDURE — G2211 COMPLEX E/M VISIT ADD ON: CPT

## 2024-10-03 PROCEDURE — 99214 OFFICE O/P EST MOD 30 MIN: CPT

## 2024-10-03 NOTE — HISTORY OF PRESENT ILLNESS
[Disease: _____________________] : Disease: [unfilled] [T: ___] : T[unfilled] [M: ___] : M[unfilled] [de-identified] : 57 yo F with    58-year-old female presents today for initial consultation of metastatic lung cancer, referred by Dr. Tinoco. Patient presented to ED with complaints of lower back pain x 1 week and palpitations on 2024. CT of the chest showed a 3cm irregular mass in the NICANOR, small infiltrates in the lungs, normal pleural and a possible mass in the medial aspect of the liver. She was discharged home and underwent a PET CT. Currently she is rating her lower back pain 6-7- improves with moving around, she admits to some weight loss since she found out of her diagnosis. She does have a dry cough, denies SOB.  PET CT 3/7/2024: Hypermetabolic left apical malignancy with widespread osseous mets Hypermetabolic left lower paratracheal node, likely metastatic Multiple bilateral pulmonary nodules measuring 7mm without hypermetabolic activity, likely below the resolution of PET, indeterminate for met disease Hypermetabolic focus between the liver and right adrenal gland favored to represent adrenal gland mets 2.9cm right thyroid nodule without hypermetabolic activity. Rec thyroid US.  3/12/2024: MRI cervical spine: Findings suspicious for osseous mets within C2 posterior elements and T2 vertebral body. Redemonstrated multiple small mets in the posterior fossa  3/12/2024: MRI Brain: New diffuse bilateral supratentorial and infratentorial small enhancing nodular lesions, compatible with mets.  3/14/2024: Biopsy L5 vertebral bone lesion: Metastatic adenocarcinoma, consistent with lung primary, involving bone. NGS PENDING  3/18/2024: MRI Lumbar Spine: Osseous mets are noted. Abscesses involving the L2 and L4 vertebral bodies protrude posteriorly.  Social Hx- Former smoker, quit 29 yrs ago, smoked 1 PPD x 17 yrs Former ETOH use Denies recreational drug use , with 2 children Self employed   Family Hx- Sister  from met breast cancer 54 Father  met lung cancer in his 80s (smoker) Maternal auth with possible lung cancer Maternal cousin  from esophageal cancer in his 50s Paternal uncle  with possible lung cancer  She never had genetic testing Last Mammogram- 2024 Colonoscopy- 7 yrs ago Pap/pelvic- unsure.     2024: MRIB:  1. Positive response: Near-complete resolution of previously described enhancing intra-axial nodules, with a 2 mm faint residual enhancing focus right precentral gyrus (series 16 image 40). No new/increasing enhancing intra-axial mass or abnormal leptomeningeal enhancement. Continued surveillance recommended.  2. Additional findings described in detail above.  2024: MRI T-spine: Metastatic deposit C2 vertebral body, unchanged. T7 inferior endplate Schmorl's node, unchanged. No new enhancing lesions are identified within the thoracic spine. MRI L-spine: Findings are consistent with the patient's known metastatic disease, similar to the prior study. No new lesions are identified.   Degenerative changes of the lumbar spine. No evidence of spinal canal stenosis.  [de-identified] : Onc:  [de-identified] : EGFR L858R, PIK3CA G2106F, PTEN D24fs, TP53 V157F, RIT1 F82L, EGFR amplification [FreeTextEntry1] : s/p 4 cycles carboplatin/pemetrexed+ Tagrisso on Tagrisso 40 mg daily (dose reduced due to abnormal labs) [de-identified] : She reports intermittent back pain.  sent her for an MRI T-spine and L-spine. This did not reveal any LM enhancement, but notable disc prolapse. She denies any ataxia, bladder or bowel dysfunction. [90: Able to carry normal activity; minor signs or symptoms of disease.] : 90: Able to carry normal activity; minor signs or symptoms of disease.  [ECOG Performance Status: 1 - Restricted in physically strenuous activity but ambulatory and able to carry out work of a light or sedentary nature] : Performance Status: 1 - Restricted in physically strenuous activity but ambulatory and able to carry out work of a light or sedentary nature, e.g., light house work, office work

## 2024-10-03 NOTE — ASSESSMENT
[Palliative] : Goals of care discussed with patient: Palliative [Palliative Care Plan] : not applicable at this time [FreeTextEntry1] : 59 yo F with EGFR L858R with multiple brain metastases here for initial evaluation.   Brain metastases - multiple mets with miliary distribution initially, excellent response to chemo+Tagrisso per FLAURA2 trial, most recent MRI brain with near complete response. She has some paraspinal muscle tenderness on exam. She underwent MRI T and L spine for further evaluation. Disc prolapse noted, which could be causing some of her pain. Reviewed cardio note. --she needed a Tagrisso interruption due to cytopenias, and is currently on 40 mg daily per , which she has been tolerating well --I independently reviewed images of MRI brain and spine, excellent response, no evidence of LMD --would keep MRIB and C-spine (due to T2 vertebral met) monitoring at Q 2 months - orders for next MRIB and C-spine placed --will discuss with  re: possible NS evaluation for her spine issues --no labs needed next visit RTC in 2 months.

## 2024-10-03 NOTE — REASON FOR VISIT
[Follow-Up Visit] : a follow-up [Initial Consultation] : an initial consultation [FreeTextEntry2] : Metastatic lung cancer

## 2024-10-03 NOTE — HISTORY OF PRESENT ILLNESS
[Disease: _____________________] : Disease: [unfilled] [T: ___] : T[unfilled] [M: ___] : M[unfilled] [de-identified] : 57 yo F with    58-year-old female presents today for initial consultation of metastatic lung cancer, referred by Dr. Tinoco. Patient presented to ED with complaints of lower back pain x 1 week and palpitations on 2024. CT of the chest showed a 3cm irregular mass in the NICANOR, small infiltrates in the lungs, normal pleural and a possible mass in the medial aspect of the liver. She was discharged home and underwent a PET CT. Currently she is rating her lower back pain 6-7- improves with moving around, she admits to some weight loss since she found out of her diagnosis. She does have a dry cough, denies SOB.  PET CT 3/7/2024: Hypermetabolic left apical malignancy with widespread osseous mets Hypermetabolic left lower paratracheal node, likely metastatic Multiple bilateral pulmonary nodules measuring 7mm without hypermetabolic activity, likely below the resolution of PET, indeterminate for met disease Hypermetabolic focus between the liver and right adrenal gland favored to represent adrenal gland mets 2.9cm right thyroid nodule without hypermetabolic activity. Rec thyroid US.  3/12/2024: MRI cervical spine: Findings suspicious for osseous mets within C2 posterior elements and T2 vertebral body. Redemonstrated multiple small mets in the posterior fossa  3/12/2024: MRI Brain: New diffuse bilateral supratentorial and infratentorial small enhancing nodular lesions, compatible with mets.  3/14/2024: Biopsy L5 vertebral bone lesion: Metastatic adenocarcinoma, consistent with lung primary, involving bone. NGS PENDING  3/18/2024: MRI Lumbar Spine: Osseous mets are noted. Abscesses involving the L2 and L4 vertebral bodies protrude posteriorly.  Social Hx- Former smoker, quit 29 yrs ago, smoked 1 PPD x 17 yrs Former ETOH use Denies recreational drug use , with 2 children Self employed   Family Hx- Sister  from met breast cancer 54 Father  met lung cancer in his 80s (smoker) Maternal auth with possible lung cancer Maternal cousin  from esophageal cancer in his 50s Paternal uncle  with possible lung cancer  She never had genetic testing Last Mammogram- 2024 Colonoscopy- 7 yrs ago Pap/pelvic- unsure.     2024: MRIB:  1. Positive response: Near-complete resolution of previously described enhancing intra-axial nodules, with a 2 mm faint residual enhancing focus right precentral gyrus (series 16 image 40). No new/increasing enhancing intra-axial mass or abnormal leptomeningeal enhancement. Continued surveillance recommended.  2. Additional findings described in detail above.  2024: MRI T-spine: Metastatic deposit C2 vertebral body, unchanged. T7 inferior endplate Schmorl's node, unchanged. No new enhancing lesions are identified within the thoracic spine. MRI L-spine: Findings are consistent with the patient's known metastatic disease, similar to the prior study. No new lesions are identified.   Degenerative changes of the lumbar spine. No evidence of spinal canal stenosis.  [de-identified] : EGFR L858R, PIK3CA N0319M, PTEN D24fs, TP53 V157F, RIT1 F82L, EGFR amplification [de-identified] : Onc:  [FreeTextEntry1] : s/p 4 cycles carboplatin/pemetrexed+ Tagrisso on Tagrisso 40 mg daily (dose reduced due to abnormal labs) [de-identified] : She reports intermittent back pain.  sent her for an MRI T-spine and L-spine. This did not reveal any LM enhancement, but notable disc prolapse. She denies any ataxia, bladder or bowel dysfunction. [90: Able to carry normal activity; minor signs or symptoms of disease.] : 90: Able to carry normal activity; minor signs or symptoms of disease.  [ECOG Performance Status: 1 - Restricted in physically strenuous activity but ambulatory and able to carry out work of a light or sedentary nature] : Performance Status: 1 - Restricted in physically strenuous activity but ambulatory and able to carry out work of a light or sedentary nature, e.g., light house work, office work

## 2024-10-10 ENCOUNTER — RESULT REVIEW (OUTPATIENT)
Age: 59
End: 2024-10-10

## 2024-10-10 ENCOUNTER — APPOINTMENT (OUTPATIENT)
Dept: HEMATOLOGY ONCOLOGY | Facility: CLINIC | Age: 59
End: 2024-10-10
Payer: COMMERCIAL

## 2024-10-10 VITALS
BODY MASS INDEX: 23.87 KG/M2 | HEART RATE: 73 BPM | TEMPERATURE: 97 F | SYSTOLIC BLOOD PRESSURE: 128 MMHG | RESPIRATION RATE: 16 BRPM | DIASTOLIC BLOOD PRESSURE: 64 MMHG | HEIGHT: 65 IN | WEIGHT: 143.25 LBS | OXYGEN SATURATION: 94 %

## 2024-10-10 DIAGNOSIS — C79.31 SECONDARY MALIGNANT NEOPLASM OF BRAIN: ICD-10-CM

## 2024-10-10 DIAGNOSIS — C34.90 MALIGNANT NEOPLASM OF UNSPECIFIED PART OF UNSPECIFIED BRONCHUS OR LUNG: ICD-10-CM

## 2024-10-10 DIAGNOSIS — M54.9 DORSALGIA, UNSPECIFIED: ICD-10-CM

## 2024-10-10 DIAGNOSIS — C79.51 MALIGNANT NEOPLASM OF UNSPECIFIED PART OF UNSPECIFIED BRONCHUS OR LUNG: ICD-10-CM

## 2024-10-10 DIAGNOSIS — C79.31 MALIGNANT NEOPLASM OF UNSPECIFIED PART OF UNSPECIFIED BRONCHUS OR LUNG: ICD-10-CM

## 2024-10-10 PROCEDURE — 99214 OFFICE O/P EST MOD 30 MIN: CPT

## 2024-10-11 ENCOUNTER — NON-APPOINTMENT (OUTPATIENT)
Age: 59
End: 2024-10-11

## 2024-11-07 ENCOUNTER — APPOINTMENT (OUTPATIENT)
Dept: HEMATOLOGY ONCOLOGY | Facility: CLINIC | Age: 59
End: 2024-11-07

## 2024-11-07 ENCOUNTER — RESULT REVIEW (OUTPATIENT)
Age: 59
End: 2024-11-07

## 2024-11-07 VITALS
BODY MASS INDEX: 23.07 KG/M2 | WEIGHT: 138.5 LBS | DIASTOLIC BLOOD PRESSURE: 70 MMHG | SYSTOLIC BLOOD PRESSURE: 115 MMHG | TEMPERATURE: 97.2 F | OXYGEN SATURATION: 99 % | HEIGHT: 65 IN | HEART RATE: 81 BPM | RESPIRATION RATE: 16 BRPM

## 2024-11-07 DIAGNOSIS — C34.92 MALIGNANT NEOPLASM OF UNSPECIFIED PART OF LEFT BRONCHUS OR LUNG: ICD-10-CM

## 2024-11-07 DIAGNOSIS — C34.90 MALIGNANT NEOPLASM OF UNSPECIFIED PART OF UNSPECIFIED BRONCHUS OR LUNG: ICD-10-CM

## 2024-11-07 DIAGNOSIS — C79.31 SECONDARY MALIGNANT NEOPLASM OF BRAIN: ICD-10-CM

## 2024-11-07 DIAGNOSIS — C79.31 MALIGNANT NEOPLASM OF UNSPECIFIED PART OF UNSPECIFIED BRONCHUS OR LUNG: ICD-10-CM

## 2024-11-07 DIAGNOSIS — R06.09 OTHER FORMS OF DYSPNEA: ICD-10-CM

## 2024-11-07 DIAGNOSIS — D50.0 IRON DEFICIENCY ANEMIA SECONDARY TO BLOOD LOSS (CHRONIC): ICD-10-CM

## 2024-11-07 PROCEDURE — 99213 OFFICE O/P EST LOW 20 MIN: CPT

## 2024-11-07 RX ORDER — DIPHENHYDRAMINE HYDROCHLORIDE AND LIDOCAINE HYDROCHLORIDE AND ALUMINUM HYDROXIDE AND MAGNESIUM HYDRO
KIT
Qty: 300 | Refills: 3 | Status: ACTIVE | COMMUNITY
Start: 2024-11-07 | End: 1900-01-01

## 2024-11-08 ENCOUNTER — RESULT REVIEW (OUTPATIENT)
Age: 59
End: 2024-11-08

## 2024-11-14 ENCOUNTER — NON-APPOINTMENT (OUTPATIENT)
Age: 59
End: 2024-11-14

## 2024-11-14 ENCOUNTER — APPOINTMENT (OUTPATIENT)
Dept: PULMONOLOGY | Facility: CLINIC | Age: 59
End: 2024-11-14
Payer: COMMERCIAL

## 2024-11-14 VITALS
DIASTOLIC BLOOD PRESSURE: 78 MMHG | WEIGHT: 138 LBS | HEIGHT: 65 IN | BODY MASS INDEX: 22.99 KG/M2 | HEART RATE: 86 BPM | OXYGEN SATURATION: 98 % | SYSTOLIC BLOOD PRESSURE: 118 MMHG

## 2024-11-14 DIAGNOSIS — G89.3 NEOPLASM RELATED PAIN (ACUTE) (CHRONIC): ICD-10-CM

## 2024-11-14 DIAGNOSIS — C79.51 MALIGNANT NEOPLASM OF UNSPECIFIED PART OF UNSPECIFIED BRONCHUS OR LUNG: ICD-10-CM

## 2024-11-14 DIAGNOSIS — C34.90 MALIGNANT NEOPLASM OF UNSPECIFIED PART OF UNSPECIFIED BRONCHUS OR LUNG: ICD-10-CM

## 2024-11-14 PROCEDURE — 99204 OFFICE O/P NEW MOD 45 MIN: CPT

## 2024-11-18 ENCOUNTER — NON-APPOINTMENT (OUTPATIENT)
Age: 59
End: 2024-11-18

## 2024-11-25 ENCOUNTER — TRANSCRIPTION ENCOUNTER (OUTPATIENT)
Age: 59
End: 2024-11-25

## 2024-11-27 ENCOUNTER — RESULT REVIEW (OUTPATIENT)
Age: 59
End: 2024-11-27

## 2024-12-02 ENCOUNTER — APPOINTMENT (OUTPATIENT)
Dept: GERIATRICS | Facility: CLINIC | Age: 59
End: 2024-12-02
Payer: COMMERCIAL

## 2024-12-02 ENCOUNTER — RESULT REVIEW (OUTPATIENT)
Age: 59
End: 2024-12-02

## 2024-12-02 ENCOUNTER — NON-APPOINTMENT (OUTPATIENT)
Age: 59
End: 2024-12-02

## 2024-12-02 VITALS
OXYGEN SATURATION: 100 % | DIASTOLIC BLOOD PRESSURE: 74 MMHG | WEIGHT: 136 LBS | BODY MASS INDEX: 22.63 KG/M2 | RESPIRATION RATE: 16 BRPM | HEART RATE: 85 BPM | SYSTOLIC BLOOD PRESSURE: 127 MMHG

## 2024-12-02 VITALS
DIASTOLIC BLOOD PRESSURE: 74 MMHG | BODY MASS INDEX: 22.66 KG/M2 | RESPIRATION RATE: 16 BRPM | OXYGEN SATURATION: 100 % | HEIGHT: 65 IN | SYSTOLIC BLOOD PRESSURE: 127 MMHG | WEIGHT: 136 LBS | TEMPERATURE: 97.2 F | HEART RATE: 85 BPM

## 2024-12-02 DIAGNOSIS — C79.31 MALIGNANT NEOPLASM OF UNSPECIFIED PART OF UNSPECIFIED BRONCHUS OR LUNG: ICD-10-CM

## 2024-12-02 DIAGNOSIS — D69.6 THROMBOCYTOPENIA, UNSPECIFIED: ICD-10-CM

## 2024-12-02 DIAGNOSIS — F32.A ANXIETY DISORDER, UNSPECIFIED: ICD-10-CM

## 2024-12-02 DIAGNOSIS — C34.90 MALIGNANT NEOPLASM OF UNSPECIFIED PART OF UNSPECIFIED BRONCHUS OR LUNG: ICD-10-CM

## 2024-12-02 DIAGNOSIS — F41.9 ANXIETY DISORDER, UNSPECIFIED: ICD-10-CM

## 2024-12-02 DIAGNOSIS — Z51.5 ENCOUNTER FOR PALLIATIVE CARE: ICD-10-CM

## 2024-12-02 DIAGNOSIS — G89.3 NEOPLASM RELATED PAIN (ACUTE) (CHRONIC): ICD-10-CM

## 2024-12-02 DIAGNOSIS — F43.23 ADJUSTMENT DISORDER WITH MIXED ANXIETY AND DEPRESSED MOOD: ICD-10-CM

## 2024-12-02 PROCEDURE — 99215 OFFICE O/P EST HI 40 MIN: CPT

## 2024-12-02 PROCEDURE — G2211 COMPLEX E/M VISIT ADD ON: CPT

## 2024-12-02 RX ORDER — BUPRENORPHINE 5 UG/H
5 PATCH, EXTENDED RELEASE TRANSDERMAL
Qty: 4 | Refills: 0 | Status: ACTIVE | COMMUNITY
Start: 2024-12-02 | End: 1900-01-01

## 2024-12-02 RX ORDER — GABAPENTIN 100 MG/1
100 CAPSULE ORAL
Qty: 30 | Refills: 0 | Status: ACTIVE | COMMUNITY
Start: 2024-12-02 | End: 1900-01-01

## 2024-12-04 ENCOUNTER — NON-APPOINTMENT (OUTPATIENT)
Age: 59
End: 2024-12-04

## 2024-12-05 ENCOUNTER — APPOINTMENT (OUTPATIENT)
Dept: HEMATOLOGY ONCOLOGY | Facility: CLINIC | Age: 59
End: 2024-12-05

## 2024-12-05 VITALS
BODY MASS INDEX: 22.69 KG/M2 | SYSTOLIC BLOOD PRESSURE: 114 MMHG | OXYGEN SATURATION: 97 % | HEIGHT: 65 IN | TEMPERATURE: 97.8 F | WEIGHT: 136.19 LBS | HEART RATE: 76 BPM | RESPIRATION RATE: 16 BRPM | DIASTOLIC BLOOD PRESSURE: 54 MMHG

## 2024-12-05 PROCEDURE — G2211 COMPLEX E/M VISIT ADD ON: CPT

## 2024-12-05 PROCEDURE — 99215 OFFICE O/P EST HI 40 MIN: CPT

## 2024-12-09 ENCOUNTER — RESULT REVIEW (OUTPATIENT)
Age: 59
End: 2024-12-09

## 2024-12-09 ENCOUNTER — APPOINTMENT (OUTPATIENT)
Dept: HEMATOLOGY ONCOLOGY | Facility: CLINIC | Age: 59
End: 2024-12-09
Payer: COMMERCIAL

## 2024-12-09 VITALS
RESPIRATION RATE: 16 BRPM | HEIGHT: 65 IN | WEIGHT: 137 LBS | HEART RATE: 76 BPM | SYSTOLIC BLOOD PRESSURE: 120 MMHG | TEMPERATURE: 97.2 F | BODY MASS INDEX: 22.82 KG/M2 | DIASTOLIC BLOOD PRESSURE: 77 MMHG | OXYGEN SATURATION: 100 %

## 2024-12-09 DIAGNOSIS — C34.90 MALIGNANT NEOPLASM OF UNSPECIFIED PART OF UNSPECIFIED BRONCHUS OR LUNG: ICD-10-CM

## 2024-12-09 DIAGNOSIS — C34.92 MALIGNANT NEOPLASM OF UNSPECIFIED PART OF LEFT BRONCHUS OR LUNG: ICD-10-CM

## 2024-12-09 DIAGNOSIS — R07.89 OTHER CHEST PAIN: ICD-10-CM

## 2024-12-09 DIAGNOSIS — C79.31 MALIGNANT NEOPLASM OF UNSPECIFIED PART OF UNSPECIFIED BRONCHUS OR LUNG: ICD-10-CM

## 2024-12-09 DIAGNOSIS — C79.31 SECONDARY MALIGNANT NEOPLASM OF BRAIN: ICD-10-CM

## 2024-12-09 DIAGNOSIS — C79.51 MALIGNANT NEOPLASM OF UNSPECIFIED PART OF UNSPECIFIED BRONCHUS OR LUNG: ICD-10-CM

## 2024-12-09 PROCEDURE — 99214 OFFICE O/P EST MOD 30 MIN: CPT

## 2024-12-11 ENCOUNTER — RESULT REVIEW (OUTPATIENT)
Age: 59
End: 2024-12-11

## 2024-12-11 ENCOUNTER — OUTPATIENT (OUTPATIENT)
Dept: OUTPATIENT SERVICES | Facility: HOSPITAL | Age: 59
LOS: 1 days | End: 2024-12-11
Payer: COMMERCIAL

## 2024-12-11 ENCOUNTER — APPOINTMENT (OUTPATIENT)
Dept: SPINE | Facility: CLINIC | Age: 59
End: 2024-12-11
Payer: COMMERCIAL

## 2024-12-11 DIAGNOSIS — M54.9 DORSALGIA, UNSPECIFIED: ICD-10-CM

## 2024-12-11 DIAGNOSIS — M54.2 CERVICALGIA: ICD-10-CM

## 2024-12-11 PROCEDURE — 72052 X-RAY EXAM NECK SPINE 6/>VWS: CPT | Mod: 26

## 2024-12-11 PROCEDURE — 99213 OFFICE O/P EST LOW 20 MIN: CPT

## 2024-12-11 PROCEDURE — 72052 X-RAY EXAM NECK SPINE 6/>VWS: CPT

## 2024-12-23 ENCOUNTER — APPOINTMENT (OUTPATIENT)
Dept: PULMONOLOGY | Facility: CLINIC | Age: 59
End: 2024-12-23
Payer: COMMERCIAL

## 2024-12-23 ENCOUNTER — APPOINTMENT (OUTPATIENT)
Dept: GERIATRICS | Facility: CLINIC | Age: 59
End: 2024-12-23

## 2024-12-23 VITALS
WEIGHT: 137 LBS | DIASTOLIC BLOOD PRESSURE: 84 MMHG | HEART RATE: 77 BPM | SYSTOLIC BLOOD PRESSURE: 132 MMHG | BODY MASS INDEX: 22.82 KG/M2 | OXYGEN SATURATION: 99 % | HEIGHT: 65 IN

## 2024-12-23 PROCEDURE — 99213 OFFICE O/P EST LOW 20 MIN: CPT

## 2025-01-01 ENCOUNTER — NON-APPOINTMENT (OUTPATIENT)
Age: 60
End: 2025-01-01

## 2025-01-06 ENCOUNTER — RESULT REVIEW (OUTPATIENT)
Age: 60
End: 2025-01-06

## 2025-01-06 ENCOUNTER — APPOINTMENT (OUTPATIENT)
Dept: HEMATOLOGY ONCOLOGY | Facility: CLINIC | Age: 60
End: 2025-01-06
Payer: COMMERCIAL

## 2025-01-06 VITALS
BODY MASS INDEX: 23.06 KG/M2 | RESPIRATION RATE: 16 BRPM | SYSTOLIC BLOOD PRESSURE: 127 MMHG | DIASTOLIC BLOOD PRESSURE: 65 MMHG | HEIGHT: 65 IN | WEIGHT: 138.38 LBS | HEART RATE: 66 BPM | OXYGEN SATURATION: 95 % | TEMPERATURE: 97.8 F

## 2025-01-06 DIAGNOSIS — E04.1 NONTOXIC SINGLE THYROID NODULE: ICD-10-CM

## 2025-01-06 DIAGNOSIS — C34.90 MALIGNANT NEOPLASM OF UNSPECIFIED PART OF UNSPECIFIED BRONCHUS OR LUNG: ICD-10-CM

## 2025-01-06 DIAGNOSIS — C34.92 MALIGNANT NEOPLASM OF UNSPECIFIED PART OF LEFT BRONCHUS OR LUNG: ICD-10-CM

## 2025-01-06 PROCEDURE — 99214 OFFICE O/P EST MOD 30 MIN: CPT

## 2025-01-06 RX ORDER — DEXAMETHASONE 0.5 MG/5ML
0.5 ELIXIR ORAL
Qty: 1 | Refills: 2 | Status: ACTIVE | COMMUNITY
Start: 2025-01-06 | End: 1900-01-01

## 2025-01-09 ENCOUNTER — TRANSCRIPTION ENCOUNTER (OUTPATIENT)
Age: 60
End: 2025-01-09

## 2025-01-09 ENCOUNTER — RESULT REVIEW (OUTPATIENT)
Age: 60
End: 2025-01-09

## 2025-01-14 ENCOUNTER — APPOINTMENT (OUTPATIENT)
Dept: PAIN MANAGEMENT | Facility: CLINIC | Age: 60
End: 2025-01-14
Payer: COMMERCIAL

## 2025-01-14 VITALS
OXYGEN SATURATION: 100 % | WEIGHT: 137 LBS | DIASTOLIC BLOOD PRESSURE: 70 MMHG | BODY MASS INDEX: 22.8 KG/M2 | HEART RATE: 78 BPM | SYSTOLIC BLOOD PRESSURE: 111 MMHG

## 2025-01-14 DIAGNOSIS — G89.4 CHRONIC PAIN SYNDROME: ICD-10-CM

## 2025-01-14 DIAGNOSIS — M47.817 SPONDYLOSIS W/OUT MYELOPATHY OR RADICULOPATHY, LUMBOSACRAL REGION: ICD-10-CM

## 2025-01-14 DIAGNOSIS — C79.31 SECONDARY MALIGNANT NEOPLASM OF BRAIN: ICD-10-CM

## 2025-01-14 DIAGNOSIS — G89.3 NEOPLASM RELATED PAIN (ACUTE) (CHRONIC): ICD-10-CM

## 2025-01-14 DIAGNOSIS — M54.16 RADICULOPATHY, LUMBAR REGION: ICD-10-CM

## 2025-01-14 DIAGNOSIS — M47.812 SPONDYLOSIS W/OUT MYELOPATHY OR RADICULOPATHY, CERVICAL REGION: ICD-10-CM

## 2025-01-14 PROCEDURE — 99205 OFFICE O/P NEW HI 60 MIN: CPT

## 2025-01-27 ENCOUNTER — RESULT REVIEW (OUTPATIENT)
Age: 60
End: 2025-01-27

## 2025-01-30 ENCOUNTER — TRANSCRIPTION ENCOUNTER (OUTPATIENT)
Age: 60
End: 2025-01-30

## 2025-01-30 ENCOUNTER — APPOINTMENT (OUTPATIENT)
Dept: PAIN MANAGEMENT | Facility: HOSPITAL | Age: 60
End: 2025-01-30

## 2025-02-05 ENCOUNTER — APPOINTMENT (OUTPATIENT)
Dept: HEMATOLOGY ONCOLOGY | Facility: CLINIC | Age: 60
End: 2025-02-05
Payer: COMMERCIAL

## 2025-02-05 ENCOUNTER — RESULT REVIEW (OUTPATIENT)
Age: 60
End: 2025-02-05

## 2025-02-05 VITALS
BODY MASS INDEX: 21.99 KG/M2 | OXYGEN SATURATION: 98 % | TEMPERATURE: 97.9 F | HEART RATE: 88 BPM | DIASTOLIC BLOOD PRESSURE: 81 MMHG | SYSTOLIC BLOOD PRESSURE: 133 MMHG | HEIGHT: 65 IN | WEIGHT: 132 LBS | RESPIRATION RATE: 17 BRPM

## 2025-02-05 DIAGNOSIS — F32.A ANXIETY DISORDER, UNSPECIFIED: ICD-10-CM

## 2025-02-05 DIAGNOSIS — D50.0 IRON DEFICIENCY ANEMIA SECONDARY TO BLOOD LOSS (CHRONIC): ICD-10-CM

## 2025-02-05 DIAGNOSIS — F41.9 ANXIETY DISORDER, UNSPECIFIED: ICD-10-CM

## 2025-02-05 DIAGNOSIS — M54.9 DORSALGIA, UNSPECIFIED: ICD-10-CM

## 2025-02-05 PROCEDURE — 99214 OFFICE O/P EST MOD 30 MIN: CPT

## 2025-02-05 RX ORDER — ESCITALOPRAM OXALATE 5 MG/1
5 TABLET ORAL DAILY
Qty: 90 | Refills: 1 | Status: ACTIVE | COMMUNITY
Start: 2025-02-05 | End: 1900-01-01

## 2025-02-06 ENCOUNTER — APPOINTMENT (OUTPATIENT)
Dept: HEMATOLOGY ONCOLOGY | Facility: CLINIC | Age: 60
End: 2025-02-06

## 2025-02-06 DIAGNOSIS — C79.31 SECONDARY MALIGNANT NEOPLASM OF BRAIN: ICD-10-CM

## 2025-02-06 DIAGNOSIS — C34.90 MALIGNANT NEOPLASM OF UNSPECIFIED PART OF UNSPECIFIED BRONCHUS OR LUNG: ICD-10-CM

## 2025-02-06 PROCEDURE — G2211 COMPLEX E/M VISIT ADD ON: CPT | Mod: 93

## 2025-02-06 PROCEDURE — 99213 OFFICE O/P EST LOW 20 MIN: CPT | Mod: 93

## 2025-02-18 ENCOUNTER — APPOINTMENT (OUTPATIENT)
Dept: PAIN MANAGEMENT | Facility: CLINIC | Age: 60
End: 2025-02-18
Payer: COMMERCIAL

## 2025-02-18 VITALS
WEIGHT: 135 LBS | SYSTOLIC BLOOD PRESSURE: 108 MMHG | DIASTOLIC BLOOD PRESSURE: 58 MMHG | HEIGHT: 65 IN | BODY MASS INDEX: 22.49 KG/M2 | OXYGEN SATURATION: 98 % | HEART RATE: 78 BPM

## 2025-02-18 DIAGNOSIS — M47.812 SPONDYLOSIS W/OUT MYELOPATHY OR RADICULOPATHY, CERVICAL REGION: ICD-10-CM

## 2025-02-18 DIAGNOSIS — M54.16 RADICULOPATHY, LUMBAR REGION: ICD-10-CM

## 2025-02-18 PROCEDURE — 99213 OFFICE O/P EST LOW 20 MIN: CPT

## 2025-02-18 RX ORDER — DULOXETINE HYDROCHLORIDE 20 MG/1
20 CAPSULE, DELAYED RELEASE ORAL
Refills: 0 | Status: ACTIVE | COMMUNITY

## 2025-03-05 ENCOUNTER — APPOINTMENT (OUTPATIENT)
Dept: HEMATOLOGY ONCOLOGY | Facility: CLINIC | Age: 60
End: 2025-03-05
Payer: COMMERCIAL

## 2025-03-05 ENCOUNTER — RESULT REVIEW (OUTPATIENT)
Age: 60
End: 2025-03-05

## 2025-03-05 VITALS
HEART RATE: 84 BPM | HEIGHT: 65 IN | BODY MASS INDEX: 21.49 KG/M2 | SYSTOLIC BLOOD PRESSURE: 104 MMHG | WEIGHT: 129 LBS | RESPIRATION RATE: 17 BRPM | OXYGEN SATURATION: 97 % | TEMPERATURE: 97.7 F | DIASTOLIC BLOOD PRESSURE: 71 MMHG

## 2025-03-05 DIAGNOSIS — C34.90 MALIGNANT NEOPLASM OF UNSPECIFIED PART OF UNSPECIFIED BRONCHUS OR LUNG: ICD-10-CM

## 2025-03-05 DIAGNOSIS — C34.92 MALIGNANT NEOPLASM OF UNSPECIFIED PART OF LEFT BRONCHUS OR LUNG: ICD-10-CM

## 2025-03-05 DIAGNOSIS — D50.0 IRON DEFICIENCY ANEMIA SECONDARY TO BLOOD LOSS (CHRONIC): ICD-10-CM

## 2025-03-05 DIAGNOSIS — C79.51 MALIGNANT NEOPLASM OF UNSPECIFIED PART OF UNSPECIFIED BRONCHUS OR LUNG: ICD-10-CM

## 2025-03-05 DIAGNOSIS — C79.31 SECONDARY MALIGNANT NEOPLASM OF BRAIN: ICD-10-CM

## 2025-03-05 PROCEDURE — 99214 OFFICE O/P EST MOD 30 MIN: CPT

## 2025-03-13 ENCOUNTER — APPOINTMENT (OUTPATIENT)
Dept: RHEUMATOLOGY | Facility: CLINIC | Age: 60
End: 2025-03-13
Payer: COMMERCIAL

## 2025-03-13 VITALS
BODY MASS INDEX: 22.49 KG/M2 | SYSTOLIC BLOOD PRESSURE: 110 MMHG | HEIGHT: 65 IN | HEART RATE: 85 BPM | DIASTOLIC BLOOD PRESSURE: 68 MMHG | OXYGEN SATURATION: 99 % | WEIGHT: 135 LBS

## 2025-03-13 DIAGNOSIS — G89.4 CHRONIC PAIN SYNDROME: ICD-10-CM

## 2025-03-13 DIAGNOSIS — R07.89 OTHER CHEST PAIN: ICD-10-CM

## 2025-03-13 PROCEDURE — 99205 OFFICE O/P NEW HI 60 MIN: CPT

## 2025-03-13 RX ORDER — MELOXICAM 15 MG/1
15 TABLET ORAL
Qty: 21 | Refills: 1 | Status: ACTIVE | COMMUNITY
Start: 2025-03-13 | End: 1900-01-01

## 2025-03-14 ENCOUNTER — TRANSCRIPTION ENCOUNTER (OUTPATIENT)
Age: 60
End: 2025-03-14

## 2025-03-19 ENCOUNTER — TRANSCRIPTION ENCOUNTER (OUTPATIENT)
Age: 60
End: 2025-03-19

## 2025-03-24 ENCOUNTER — APPOINTMENT (OUTPATIENT)
Dept: PAIN MANAGEMENT | Facility: CLINIC | Age: 60
End: 2025-03-24

## 2025-03-25 ENCOUNTER — APPOINTMENT (OUTPATIENT)
Dept: HEMATOLOGY ONCOLOGY | Facility: CLINIC | Age: 60
End: 2025-03-25

## 2025-03-25 ENCOUNTER — RESULT REVIEW (OUTPATIENT)
Age: 60
End: 2025-03-25

## 2025-03-27 ENCOUNTER — APPOINTMENT (OUTPATIENT)
Dept: PAIN MANAGEMENT | Facility: HOSPITAL | Age: 60
End: 2025-03-27

## 2025-03-27 ENCOUNTER — TRANSCRIPTION ENCOUNTER (OUTPATIENT)
Age: 60
End: 2025-03-27

## 2025-04-02 ENCOUNTER — RESULT REVIEW (OUTPATIENT)
Age: 60
End: 2025-04-02

## 2025-04-02 ENCOUNTER — APPOINTMENT (OUTPATIENT)
Dept: HEMATOLOGY ONCOLOGY | Facility: CLINIC | Age: 60
End: 2025-04-02
Payer: COMMERCIAL

## 2025-04-02 VITALS
WEIGHT: 129.1 LBS | TEMPERATURE: 97.3 F | DIASTOLIC BLOOD PRESSURE: 78 MMHG | HEIGHT: 65 IN | BODY MASS INDEX: 21.51 KG/M2 | HEART RATE: 76 BPM | OXYGEN SATURATION: 98 % | SYSTOLIC BLOOD PRESSURE: 121 MMHG | RESPIRATION RATE: 17 BRPM

## 2025-04-02 DIAGNOSIS — F32.A ANXIETY DISORDER, UNSPECIFIED: ICD-10-CM

## 2025-04-02 DIAGNOSIS — C34.12 MALIGNANT NEOPLASM OF UPPER LOBE, LEFT BRONCHUS OR LUNG: ICD-10-CM

## 2025-04-02 DIAGNOSIS — C34.90 MALIGNANT NEOPLASM OF UNSPECIFIED PART OF UNSPECIFIED BRONCHUS OR LUNG: ICD-10-CM

## 2025-04-02 DIAGNOSIS — R92.30 DENSE BREASTS, UNSPECIFIED: ICD-10-CM

## 2025-04-02 DIAGNOSIS — F41.9 ANXIETY DISORDER, UNSPECIFIED: ICD-10-CM

## 2025-04-02 DIAGNOSIS — C79.31 MALIGNANT NEOPLASM OF UNSPECIFIED PART OF UNSPECIFIED BRONCHUS OR LUNG: ICD-10-CM

## 2025-04-02 PROCEDURE — 99214 OFFICE O/P EST MOD 30 MIN: CPT

## 2025-04-07 ENCOUNTER — APPOINTMENT (OUTPATIENT)
Dept: RADIATION ONCOLOGY | Facility: CLINIC | Age: 60
End: 2025-04-07
Payer: COMMERCIAL

## 2025-04-07 VITALS
SYSTOLIC BLOOD PRESSURE: 114 MMHG | DIASTOLIC BLOOD PRESSURE: 69 MMHG | RESPIRATION RATE: 16 BRPM | HEART RATE: 86 BPM | WEIGHT: 133 LBS | OXYGEN SATURATION: 100 % | BODY MASS INDEX: 22.13 KG/M2

## 2025-04-07 PROCEDURE — G2211 COMPLEX E/M VISIT ADD ON: CPT

## 2025-04-07 PROCEDURE — 99215 OFFICE O/P EST HI 40 MIN: CPT

## 2025-04-09 PROBLEM — C34.12 MALIGNANT NEOPLASM OF UPPER LOBE, LEFT BRONCHUS OR LUNG: Status: ACTIVE | Noted: 2025-04-07

## 2025-04-11 ENCOUNTER — RESULT REVIEW (OUTPATIENT)
Age: 60
End: 2025-04-11

## 2025-04-11 ENCOUNTER — APPOINTMENT (OUTPATIENT)
Dept: PAIN MANAGEMENT | Facility: CLINIC | Age: 60
End: 2025-04-11

## 2025-04-15 ENCOUNTER — APPOINTMENT (OUTPATIENT)
Dept: PAIN MANAGEMENT | Facility: CLINIC | Age: 60
End: 2025-04-15
Payer: COMMERCIAL

## 2025-04-15 VITALS
HEIGHT: 65 IN | SYSTOLIC BLOOD PRESSURE: 94 MMHG | OXYGEN SATURATION: 100 % | HEART RATE: 77 BPM | WEIGHT: 133 LBS | DIASTOLIC BLOOD PRESSURE: 70 MMHG | BODY MASS INDEX: 22.16 KG/M2

## 2025-04-15 DIAGNOSIS — M47.812 SPONDYLOSIS W/OUT MYELOPATHY OR RADICULOPATHY, CERVICAL REGION: ICD-10-CM

## 2025-04-15 PROCEDURE — 99213 OFFICE O/P EST LOW 20 MIN: CPT

## 2025-04-22 ENCOUNTER — RX RENEWAL (OUTPATIENT)
Age: 60
End: 2025-04-22

## 2025-04-24 ENCOUNTER — APPOINTMENT (OUTPATIENT)
Dept: NEUROSURGERY | Facility: CLINIC | Age: 60
End: 2025-04-24
Payer: COMMERCIAL

## 2025-04-24 VITALS
OXYGEN SATURATION: 100 % | BODY MASS INDEX: 21.66 KG/M2 | SYSTOLIC BLOOD PRESSURE: 98 MMHG | HEIGHT: 65 IN | HEART RATE: 83 BPM | WEIGHT: 130 LBS | DIASTOLIC BLOOD PRESSURE: 56 MMHG

## 2025-04-24 PROCEDURE — 99215 OFFICE O/P EST HI 40 MIN: CPT

## 2025-04-28 ENCOUNTER — NON-APPOINTMENT (OUTPATIENT)
Age: 60
End: 2025-04-28

## 2025-04-28 VITALS
SYSTOLIC BLOOD PRESSURE: 120 MMHG | WEIGHT: 130 LBS | HEART RATE: 95 BPM | OXYGEN SATURATION: 97 % | RESPIRATION RATE: 16 BRPM | BODY MASS INDEX: 21.63 KG/M2 | DIASTOLIC BLOOD PRESSURE: 76 MMHG

## 2025-04-30 ENCOUNTER — APPOINTMENT (OUTPATIENT)
Dept: HEMATOLOGY ONCOLOGY | Facility: CLINIC | Age: 60
End: 2025-04-30

## 2025-04-30 ENCOUNTER — RESULT REVIEW (OUTPATIENT)
Age: 60
End: 2025-04-30

## 2025-04-30 VITALS
RESPIRATION RATE: 16 BRPM | WEIGHT: 130 LBS | DIASTOLIC BLOOD PRESSURE: 72 MMHG | TEMPERATURE: 97.4 F | HEIGHT: 65 IN | HEART RATE: 71 BPM | SYSTOLIC BLOOD PRESSURE: 109 MMHG | BODY MASS INDEX: 21.66 KG/M2

## 2025-05-07 DIAGNOSIS — Z00.00 ENCOUNTER FOR GENERAL ADULT MEDICAL EXAMINATION W/OUT ABNORMAL FINDINGS: ICD-10-CM

## 2025-05-09 ENCOUNTER — RESULT REVIEW (OUTPATIENT)
Age: 60
End: 2025-05-09

## 2025-05-09 ENCOUNTER — APPOINTMENT (OUTPATIENT)
Dept: HEMATOLOGY ONCOLOGY | Facility: CLINIC | Age: 60
End: 2025-05-09

## 2025-05-09 VITALS
HEART RATE: 71 BPM | TEMPERATURE: 98 F | OXYGEN SATURATION: 100 % | SYSTOLIC BLOOD PRESSURE: 115 MMHG | WEIGHT: 131 LBS | BODY MASS INDEX: 25.72 KG/M2 | DIASTOLIC BLOOD PRESSURE: 68 MMHG | HEIGHT: 60 IN | RESPIRATION RATE: 16 BRPM

## 2025-05-09 VITALS — HEIGHT: 65 IN | HEART RATE: 79 BPM | OXYGEN SATURATION: 100 % | RESPIRATION RATE: 17 BRPM

## 2025-05-12 ENCOUNTER — RESULT REVIEW (OUTPATIENT)
Age: 60
End: 2025-05-12

## 2025-05-15 ENCOUNTER — RESULT REVIEW (OUTPATIENT)
Age: 60
End: 2025-05-15

## 2025-05-16 ENCOUNTER — APPOINTMENT (OUTPATIENT)
Dept: GERIATRICS | Facility: CLINIC | Age: 60
End: 2025-05-16
Payer: COMMERCIAL

## 2025-05-16 PROCEDURE — 99214 OFFICE O/P EST MOD 30 MIN: CPT

## 2025-05-16 RX ORDER — GABAPENTIN 300 MG/1
300 CAPSULE ORAL 3 TIMES DAILY
Qty: 90 | Refills: 0 | Status: ACTIVE | COMMUNITY
Start: 2025-05-16 | End: 1900-01-01

## 2025-05-16 RX ORDER — OXYCODONE 5 MG/1
5 TABLET ORAL
Qty: 42 | Refills: 0 | Status: ACTIVE | COMMUNITY
Start: 2025-05-16 | End: 1900-01-01

## 2025-05-20 ENCOUNTER — APPOINTMENT (OUTPATIENT)
Dept: GERIATRICS | Facility: CLINIC | Age: 60
End: 2025-05-20

## 2025-05-20 VITALS
OXYGEN SATURATION: 99 % | BODY MASS INDEX: 24.8 KG/M2 | WEIGHT: 127 LBS | HEIGHT: 65 IN | DIASTOLIC BLOOD PRESSURE: 64 MMHG | HEART RATE: 76 BPM | SYSTOLIC BLOOD PRESSURE: 106 MMHG | RESPIRATION RATE: 16 BRPM

## 2025-05-20 PROCEDURE — 99497 ADVNCD CARE PLAN 30 MIN: CPT | Mod: 1L

## 2025-05-22 ENCOUNTER — TRANSCRIPTION ENCOUNTER (OUTPATIENT)
Age: 60
End: 2025-05-22

## 2025-05-22 ENCOUNTER — APPOINTMENT (OUTPATIENT)
Dept: PAIN MANAGEMENT | Facility: HOSPITAL | Age: 60
End: 2025-05-22

## 2025-05-23 ENCOUNTER — RESULT REVIEW (OUTPATIENT)
Age: 60
End: 2025-05-23

## 2025-05-27 ENCOUNTER — RESULT REVIEW (OUTPATIENT)
Age: 60
End: 2025-05-27

## 2025-05-28 ENCOUNTER — APPOINTMENT (OUTPATIENT)
Dept: HEMATOLOGY ONCOLOGY | Facility: CLINIC | Age: 60
End: 2025-05-28
Payer: COMMERCIAL

## 2025-05-28 ENCOUNTER — RESULT REVIEW (OUTPATIENT)
Age: 60
End: 2025-05-28

## 2025-05-28 VITALS
RESPIRATION RATE: 17 BRPM | HEART RATE: 86 BPM | OXYGEN SATURATION: 98 % | TEMPERATURE: 98.2 F | BODY MASS INDEX: 21.16 KG/M2 | HEIGHT: 65 IN | WEIGHT: 127 LBS | DIASTOLIC BLOOD PRESSURE: 68 MMHG | SYSTOLIC BLOOD PRESSURE: 100 MMHG

## 2025-05-28 DIAGNOSIS — C34.90 MALIGNANT NEOPLASM OF UNSPECIFIED PART OF UNSPECIFIED BRONCHUS OR LUNG: ICD-10-CM

## 2025-05-28 DIAGNOSIS — C79.31 MALIGNANT NEOPLASM OF UNSPECIFIED PART OF UNSPECIFIED BRONCHUS OR LUNG: ICD-10-CM

## 2025-05-28 DIAGNOSIS — D63.8 ANEMIA IN OTHER CHRONIC DISEASES CLASSIFIED ELSEWHERE: ICD-10-CM

## 2025-05-28 DIAGNOSIS — D69.6 THROMBOCYTOPENIA, UNSPECIFIED: ICD-10-CM

## 2025-05-28 PROCEDURE — 99214 OFFICE O/P EST MOD 30 MIN: CPT

## 2025-05-29 ENCOUNTER — TRANSCRIPTION ENCOUNTER (OUTPATIENT)
Age: 60
End: 2025-05-29

## 2025-06-02 ENCOUNTER — APPOINTMENT (OUTPATIENT)
Dept: RADIATION ONCOLOGY | Facility: CLINIC | Age: 60
End: 2025-06-02
Payer: COMMERCIAL

## 2025-06-02 ENCOUNTER — NON-APPOINTMENT (OUTPATIENT)
Age: 60
End: 2025-06-02

## 2025-06-02 ENCOUNTER — APPOINTMENT (OUTPATIENT)
Dept: RADIATION ONCOLOGY | Facility: CLINIC | Age: 60
End: 2025-06-02

## 2025-06-02 VITALS
BODY MASS INDEX: 21.13 KG/M2 | SYSTOLIC BLOOD PRESSURE: 105 MMHG | HEART RATE: 83 BPM | RESPIRATION RATE: 16 BRPM | WEIGHT: 127 LBS | DIASTOLIC BLOOD PRESSURE: 72 MMHG | OXYGEN SATURATION: 100 % | TEMPERATURE: 98 F

## 2025-06-02 DIAGNOSIS — C34.12 MALIGNANT NEOPLASM OF UPPER LOBE, LEFT BRONCHUS OR LUNG: ICD-10-CM

## 2025-06-02 PROCEDURE — 99212 OFFICE O/P EST SF 10 MIN: CPT

## 2025-06-03 ENCOUNTER — NON-APPOINTMENT (OUTPATIENT)
Age: 60
End: 2025-06-03

## 2025-06-03 RX ORDER — AMOXICILLIN AND CLAVULANATE POTASSIUM 875; 125 MG/1; MG/1
875-125 TABLET, COATED ORAL
Qty: 14 | Refills: 0 | Status: ACTIVE | COMMUNITY
Start: 2025-06-03 | End: 1900-01-01

## 2025-06-05 ENCOUNTER — APPOINTMENT (OUTPATIENT)
Dept: GERIATRICS | Facility: CLINIC | Age: 60
End: 2025-06-05

## 2025-06-05 ENCOUNTER — APPOINTMENT (OUTPATIENT)
Dept: NEUROSURGERY | Facility: CLINIC | Age: 60
End: 2025-06-05

## 2025-06-05 VITALS
SYSTOLIC BLOOD PRESSURE: 96 MMHG | BODY MASS INDEX: 20.14 KG/M2 | WEIGHT: 121 LBS | HEART RATE: 92 BPM | OXYGEN SATURATION: 99 % | RESPIRATION RATE: 16 BRPM | DIASTOLIC BLOOD PRESSURE: 61 MMHG

## 2025-06-05 DIAGNOSIS — G89.3 NEOPLASM RELATED PAIN (ACUTE) (CHRONIC): ICD-10-CM

## 2025-06-05 PROCEDURE — ZZZZZ: CPT

## 2025-06-05 RX ORDER — OXYCODONE HYDROCHLORIDE 10 MG/1
10 TABLET, FILM COATED, EXTENDED RELEASE ORAL
Qty: 60 | Refills: 0 | Status: ACTIVE | COMMUNITY
Start: 2025-06-05 | End: 1900-01-01

## 2025-06-05 RX ORDER — OXYCODONE 10 MG/1
10 TABLET ORAL EVERY 6 HOURS
Qty: 12 | Refills: 0 | Status: ACTIVE | COMMUNITY
Start: 2025-06-05 | End: 1900-01-01

## 2025-06-05 RX ORDER — TRAMADOL HYDROCHLORIDE 50 MG/1
50 TABLET, COATED ORAL
Qty: 120 | Refills: 0 | Status: ACTIVE | COMMUNITY
Start: 2025-06-05 | End: 1900-01-01

## 2025-06-05 RX ORDER — TRAMADOL HYDROCHLORIDE 50 MG/1
50 TABLET, COATED ORAL
Qty: 12 | Refills: 0 | Status: ACTIVE | COMMUNITY
Start: 2025-05-29

## 2025-06-05 RX ORDER — CYCLOBENZAPRINE HYDROCHLORIDE 5 MG/1
5 TABLET, FILM COATED ORAL
Qty: 12 | Refills: 0 | Status: ACTIVE | COMMUNITY
Start: 2025-05-29

## 2025-06-06 ENCOUNTER — RESULT REVIEW (OUTPATIENT)
Age: 60
End: 2025-06-06

## 2025-06-09 ENCOUNTER — APPOINTMENT (OUTPATIENT)
Dept: PAIN MANAGEMENT | Facility: CLINIC | Age: 60
End: 2025-06-09
Payer: COMMERCIAL

## 2025-06-09 PROCEDURE — 99214 OFFICE O/P EST MOD 30 MIN: CPT | Mod: 95

## 2025-06-10 ENCOUNTER — APPOINTMENT (OUTPATIENT)
Dept: PAIN MANAGEMENT | Facility: CLINIC | Age: 60
End: 2025-06-10

## 2025-06-12 ENCOUNTER — RX RENEWAL (OUTPATIENT)
Age: 60
End: 2025-06-12

## 2025-06-16 ENCOUNTER — APPOINTMENT (OUTPATIENT)
Dept: NEUROSURGERY | Facility: CLINIC | Age: 60
End: 2025-06-16
Payer: COMMERCIAL

## 2025-06-16 PROBLEM — H53.9 VISION CHANGES: Status: ACTIVE | Noted: 2025-06-16

## 2025-06-16 PROBLEM — J34.89 RHINORRHEA: Status: ACTIVE | Noted: 2025-06-16

## 2025-06-16 PROCEDURE — 99215 OFFICE O/P EST HI 40 MIN: CPT

## 2025-06-18 ENCOUNTER — RESULT REVIEW (OUTPATIENT)
Age: 60
End: 2025-06-18

## 2025-06-19 ENCOUNTER — APPOINTMENT (OUTPATIENT)
Dept: NEUROSURGERY | Facility: CLINIC | Age: 60
End: 2025-06-19
Payer: COMMERCIAL

## 2025-06-19 VITALS
SYSTOLIC BLOOD PRESSURE: 116 MMHG | OXYGEN SATURATION: 100 % | HEIGHT: 65 IN | BODY MASS INDEX: 20.16 KG/M2 | HEART RATE: 75 BPM | WEIGHT: 121 LBS | DIASTOLIC BLOOD PRESSURE: 60 MMHG

## 2025-06-19 PROCEDURE — G2211 COMPLEX E/M VISIT ADD ON: CPT

## 2025-06-19 PROCEDURE — 99214 OFFICE O/P EST MOD 30 MIN: CPT

## 2025-06-20 ENCOUNTER — NON-APPOINTMENT (OUTPATIENT)
Age: 60
End: 2025-06-20

## 2025-06-20 PROBLEM — M89.8X8 MASS OF SPINE: Status: ACTIVE | Noted: 2025-06-20

## 2025-06-26 ENCOUNTER — APPOINTMENT (OUTPATIENT)
Dept: GERIATRICS | Facility: CLINIC | Age: 60
End: 2025-06-26

## 2025-06-27 ENCOUNTER — RESULT REVIEW (OUTPATIENT)
Age: 60
End: 2025-06-27

## 2025-06-27 ENCOUNTER — NON-APPOINTMENT (OUTPATIENT)
Age: 60
End: 2025-06-27

## 2025-07-01 ENCOUNTER — TRANSCRIPTION ENCOUNTER (OUTPATIENT)
Age: 60
End: 2025-07-01

## 2025-07-03 ENCOUNTER — NON-APPOINTMENT (OUTPATIENT)
Age: 60
End: 2025-07-03

## 2025-07-09 ENCOUNTER — RESULT REVIEW (OUTPATIENT)
Age: 60
End: 2025-07-09

## 2025-07-09 ENCOUNTER — APPOINTMENT (OUTPATIENT)
Dept: HEMATOLOGY ONCOLOGY | Facility: CLINIC | Age: 60
End: 2025-07-09
Payer: COMMERCIAL

## 2025-07-09 VITALS
HEIGHT: 65 IN | RESPIRATION RATE: 17 BRPM | OXYGEN SATURATION: 99 % | DIASTOLIC BLOOD PRESSURE: 68 MMHG | HEART RATE: 72 BPM | SYSTOLIC BLOOD PRESSURE: 130 MMHG | TEMPERATURE: 98.1 F

## 2025-07-09 PROCEDURE — 99214 OFFICE O/P EST MOD 30 MIN: CPT

## 2025-07-09 RX ORDER — VALACYCLOVIR 1 G/1
1 TABLET, FILM COATED ORAL
Qty: 14 | Refills: 0 | Status: ACTIVE | COMMUNITY
Start: 2025-07-09 | End: 1900-01-01

## 2025-07-09 RX ORDER — PANTOPRAZOLE 40 MG/1
40 TABLET, DELAYED RELEASE ORAL DAILY
Refills: 0 | Status: ACTIVE | COMMUNITY
Start: 2025-07-02

## 2025-07-09 RX ORDER — POLYETHYLENE GLYCOL 3350 17 G/17G
17 POWDER, FOR SOLUTION ORAL DAILY
Refills: 0 | Status: ACTIVE | COMMUNITY
Start: 2025-07-02

## 2025-07-09 RX ORDER — DEXAMETHASONE 6 MG/1
6 TABLET ORAL TWICE DAILY
Refills: 0 | Status: ACTIVE | COMMUNITY
Start: 2025-07-02

## 2025-07-10 ENCOUNTER — NON-APPOINTMENT (OUTPATIENT)
Age: 60
End: 2025-07-10

## 2025-07-10 RX ORDER — APIXABAN 2.5 MG/1
2.5 TABLET, FILM COATED ORAL TWICE DAILY
Qty: 60 | Refills: 5 | Status: ACTIVE | COMMUNITY
Start: 2025-07-10 | End: 1900-01-01

## 2025-07-10 RX ORDER — LAZERTINIB 80 MG/1
80 TABLET, FILM COATED ORAL
Qty: 60 | Refills: 5 | Status: ACTIVE | COMMUNITY
Start: 2025-07-10 | End: 1900-01-01

## 2025-07-10 RX ORDER — DOXYCYCLINE HYCLATE 100 MG/1
100 CAPSULE ORAL
Qty: 30 | Refills: 4 | Status: ACTIVE | COMMUNITY
Start: 2025-07-10 | End: 1900-01-01

## 2025-07-14 ENCOUNTER — APPOINTMENT (OUTPATIENT)
Dept: NEUROSURGERY | Facility: CLINIC | Age: 60
End: 2025-07-14
Payer: COMMERCIAL

## 2025-07-14 PROBLEM — G96.198 LEPTOMENINGEAL DISEASE: Status: ACTIVE | Noted: 2025-07-10

## 2025-07-14 PROCEDURE — 99215 OFFICE O/P EST HI 40 MIN: CPT

## 2025-07-15 ENCOUNTER — APPOINTMENT (OUTPATIENT)
Dept: HEMATOLOGY ONCOLOGY | Facility: CLINIC | Age: 60
End: 2025-07-15

## 2025-07-17 ENCOUNTER — NON-APPOINTMENT (OUTPATIENT)
Age: 60
End: 2025-07-17

## 2025-07-17 ENCOUNTER — RESULT REVIEW (OUTPATIENT)
Age: 60
End: 2025-07-17

## 2025-07-18 ENCOUNTER — RESULT REVIEW (OUTPATIENT)
Age: 60
End: 2025-07-18

## 2025-07-18 ENCOUNTER — NON-APPOINTMENT (OUTPATIENT)
Age: 60
End: 2025-07-18

## 2025-07-22 ENCOUNTER — TRANSCRIPTION ENCOUNTER (OUTPATIENT)
Age: 60
End: 2025-07-22

## 2025-07-23 ENCOUNTER — APPOINTMENT (OUTPATIENT)
Dept: HEMATOLOGY ONCOLOGY | Facility: CLINIC | Age: 60
End: 2025-07-23

## 2025-07-30 ENCOUNTER — APPOINTMENT (OUTPATIENT)
Dept: HEMATOLOGY ONCOLOGY | Facility: CLINIC | Age: 60
End: 2025-07-30

## 2025-08-01 ENCOUNTER — TRANSCRIPTION ENCOUNTER (OUTPATIENT)
Age: 60
End: 2025-08-01

## 2025-08-01 ENCOUNTER — APPOINTMENT (OUTPATIENT)
Dept: PAIN MANAGEMENT | Facility: CLINIC | Age: 60
End: 2025-08-01

## 2025-08-06 ENCOUNTER — APPOINTMENT (OUTPATIENT)
Dept: HEMATOLOGY ONCOLOGY | Facility: CLINIC | Age: 60
End: 2025-08-06

## 2025-08-18 ENCOUNTER — APPOINTMENT (OUTPATIENT)
Dept: PULMONOLOGY | Facility: CLINIC | Age: 60
End: 2025-08-18

## 2025-09-08 ENCOUNTER — APPOINTMENT (OUTPATIENT)
Dept: RADIATION ONCOLOGY | Facility: CLINIC | Age: 60
End: 2025-09-08